# Patient Record
Sex: MALE | Race: WHITE | Employment: FULL TIME | URBAN - NONMETROPOLITAN AREA
[De-identification: names, ages, dates, MRNs, and addresses within clinical notes are randomized per-mention and may not be internally consistent; named-entity substitution may affect disease eponyms.]

---

## 2017-04-11 ENCOUNTER — OFFICE VISIT (OUTPATIENT)
Dept: FAMILY MEDICINE CLINIC | Age: 52
End: 2017-04-11

## 2017-04-11 VITALS
DIASTOLIC BLOOD PRESSURE: 78 MMHG | OXYGEN SATURATION: 98 % | WEIGHT: 214.4 LBS | HEART RATE: 70 BPM | HEIGHT: 71 IN | SYSTOLIC BLOOD PRESSURE: 136 MMHG | BODY MASS INDEX: 30.02 KG/M2 | RESPIRATION RATE: 16 BRPM

## 2017-04-11 DIAGNOSIS — Z12.11 SPECIAL SCREENING FOR MALIGNANT NEOPLASMS, COLON: ICD-10-CM

## 2017-04-11 DIAGNOSIS — E11.9 TYPE 2 DIABETES MELLITUS WITHOUT COMPLICATION, WITHOUT LONG-TERM CURRENT USE OF INSULIN (HCC): Primary | ICD-10-CM

## 2017-04-11 DIAGNOSIS — I10 ESSENTIAL HYPERTENSION: ICD-10-CM

## 2017-04-11 PROCEDURE — 99204 OFFICE O/P NEW MOD 45 MIN: CPT | Performed by: FAMILY MEDICINE

## 2017-04-11 RX ORDER — LISINOPRIL 5 MG/1
5 TABLET ORAL DAILY
Qty: 90 TABLET | Refills: 2 | Status: SHIPPED | OUTPATIENT
Start: 2017-04-11 | End: 2019-09-24 | Stop reason: SDUPTHER

## 2017-04-11 RX ORDER — ASPIRIN 81 MG/1
81 TABLET ORAL DAILY
Qty: 30 TABLET | Refills: 11
Start: 2017-04-11

## 2017-04-11 ASSESSMENT — ENCOUNTER SYMPTOMS
EYE DISCHARGE: 0
BACK PAIN: 0
SHORTNESS OF BREATH: 0
BLOOD IN STOOL: 0
WHEEZING: 0
SINUS PRESSURE: 0
EYE REDNESS: 0
NAUSEA: 0
RHINORRHEA: 0
SORE THROAT: 0
APNEA: 0
VOMITING: 0
ABDOMINAL PAIN: 0
COUGH: 0
COLOR CHANGE: 0
DIARRHEA: 0
CONSTIPATION: 0

## 2019-09-24 ENCOUNTER — OFFICE VISIT (OUTPATIENT)
Dept: FAMILY MEDICINE CLINIC | Age: 54
End: 2019-09-24
Payer: COMMERCIAL

## 2019-09-24 VITALS
WEIGHT: 200.2 LBS | OXYGEN SATURATION: 98 % | HEART RATE: 78 BPM | SYSTOLIC BLOOD PRESSURE: 144 MMHG | RESPIRATION RATE: 12 BRPM | HEIGHT: 70 IN | TEMPERATURE: 98 F | BODY MASS INDEX: 28.66 KG/M2 | DIASTOLIC BLOOD PRESSURE: 82 MMHG

## 2019-09-24 DIAGNOSIS — Z11.59 NEED FOR HEPATITIS B SCREENING TEST: ICD-10-CM

## 2019-09-24 DIAGNOSIS — E11.65 TYPE 2 DIABETES MELLITUS WITH HYPERGLYCEMIA, WITHOUT LONG-TERM CURRENT USE OF INSULIN (HCC): Primary | ICD-10-CM

## 2019-09-24 DIAGNOSIS — I10 ESSENTIAL HYPERTENSION: ICD-10-CM

## 2019-09-24 DIAGNOSIS — E55.9 VITAMIN D DEFICIENCY: ICD-10-CM

## 2019-09-24 DIAGNOSIS — Z23 NEED FOR INFLUENZA VACCINATION: ICD-10-CM

## 2019-09-24 DIAGNOSIS — Z23 NEED FOR SHINGLES VACCINE: ICD-10-CM

## 2019-09-24 DIAGNOSIS — Z11.59 ENCOUNTER FOR HEPATITIS C SCREENING TEST FOR LOW RISK PATIENT: ICD-10-CM

## 2019-09-24 DIAGNOSIS — Z23 NEED FOR TETANUS BOOSTER: ICD-10-CM

## 2019-09-24 DIAGNOSIS — Z12.11 COLON CANCER SCREENING: ICD-10-CM

## 2019-09-24 DIAGNOSIS — Z11.4 SCREENING FOR HIV (HUMAN IMMUNODEFICIENCY VIRUS): ICD-10-CM

## 2019-09-24 DIAGNOSIS — Z13.29 SCREENING FOR THYROID DISORDER: ICD-10-CM

## 2019-09-24 PROBLEM — E11.9 TYPE 2 DIABETES MELLITUS WITHOUT COMPLICATION (HCC): Status: RESOLVED | Noted: 2017-04-11 | Resolved: 2019-09-24

## 2019-09-24 LAB — HBA1C MFR BLD: 13.2 % (ref 4.3–5.7)

## 2019-09-24 PROCEDURE — 1036F TOBACCO NON-USER: CPT | Performed by: NURSE PRACTITIONER

## 2019-09-24 PROCEDURE — 2022F DILAT RTA XM EVC RTNOPTHY: CPT | Performed by: NURSE PRACTITIONER

## 2019-09-24 PROCEDURE — 3017F COLORECTAL CA SCREEN DOC REV: CPT | Performed by: NURSE PRACTITIONER

## 2019-09-24 PROCEDURE — G8427 DOCREV CUR MEDS BY ELIG CLIN: HCPCS | Performed by: NURSE PRACTITIONER

## 2019-09-24 PROCEDURE — 3046F HEMOGLOBIN A1C LEVEL >9.0%: CPT | Performed by: NURSE PRACTITIONER

## 2019-09-24 PROCEDURE — G8419 CALC BMI OUT NRM PARAM NOF/U: HCPCS | Performed by: NURSE PRACTITIONER

## 2019-09-24 PROCEDURE — 99204 OFFICE O/P NEW MOD 45 MIN: CPT | Performed by: NURSE PRACTITIONER

## 2019-09-24 RX ORDER — LISINOPRIL 5 MG/1
5 TABLET ORAL DAILY
Qty: 90 TABLET | Refills: 2 | Status: SHIPPED | OUTPATIENT
Start: 2019-09-24 | End: 2021-09-21 | Stop reason: SDUPTHER

## 2019-09-24 ASSESSMENT — ENCOUNTER SYMPTOMS
COLOR CHANGE: 0
SHORTNESS OF BREATH: 0
EYE DISCHARGE: 0
RHINORRHEA: 0
COUGH: 0
EYE REDNESS: 0
ABDOMINAL PAIN: 0
ANAL BLEEDING: 0
DIARRHEA: 0
BLOOD IN STOOL: 0
ABDOMINAL DISTENTION: 0
NAUSEA: 0
CONSTIPATION: 0
SORE THROAT: 0

## 2019-09-24 ASSESSMENT — PATIENT HEALTH QUESTIONNAIRE - PHQ9
SUM OF ALL RESPONSES TO PHQ QUESTIONS 1-9: 0
1. LITTLE INTEREST OR PLEASURE IN DOING THINGS: 0
SUM OF ALL RESPONSES TO PHQ9 QUESTIONS 1 & 2: 0
SUM OF ALL RESPONSES TO PHQ QUESTIONS 1-9: 0
2. FEELING DOWN, DEPRESSED OR HOPELESS: 0

## 2019-09-24 NOTE — PATIENT INSTRUCTIONS
a heart attack or stroke. But taking aspirin isn't right for everyone, because it can cause serious bleeding. · See your doctor regularly. You may need to see the doctor more often at first or until your blood pressure comes down. · If you are taking blood pressure medicine, talk to your doctor before you take decongestants or anti-inflammatory medicine, such as ibuprofen. Some of these medicines can raise blood pressure. · Learn how to check your blood pressure at home. Lifestyle changes  · Stay at a healthy weight. This is especially important if you put on weight around the waist. Losing even 10 pounds can help you lower your blood pressure. · If your doctor recommends it, get more exercise. Walking is a good choice. Bit by bit, increase the amount you walk every day. Try for at least 30 minutes on most days of the week. You also may want to swim, bike, or do other activities. · Avoid or limit alcohol. Talk to your doctor about whether you can drink any alcohol. · Try to limit how much sodium you eat to less than 2,300 milligrams (mg) a day. Your doctor may ask you to try to eat less than 1,500 mg a day. · Eat plenty of fruits (such as bananas and oranges), vegetables, legumes, whole grains, and low-fat dairy products. · Lower the amount of saturated fat in your diet. Saturated fat is found in animal products such as milk, cheese, and meat. Limiting these foods may help you lose weight and also lower your risk for heart disease. · Do not smoke. Smoking increases your risk for heart attack and stroke. If you need help quitting, talk to your doctor about stop-smoking programs and medicines. These can increase your chances of quitting for good. When should you call for help? Call 911 anytime you think you may need emergency care. This may mean having symptoms that suggest that your blood pressure is causing a serious heart or blood vessel problem.  Your blood pressure may be over 180/120.   For example, by [facility] [place] [facility] [facility] If you have questions about a medical condition or this instruction, always ask your healthcare professional. [address] any warranty or liability for your use of this information. Patient Education        Acute High Blood Pressure: Care Instructions  Your Care Instructions    Acute high blood pressure is very high blood pressure. It's a serious problem. Very high blood pressure can damage your brain, heart, eyes, and kidneys. You may have been given medicines to lower your blood pressure. You may have gotten them as pills or through a needle in one of your veins. This is called an IV. And maybe you were given other medicines too. These can be needed when high blood pressure causes other problems. To keep your blood pressure at a lower level, you may need to make healthy lifestyle changes. And you will probably need to take medicines. Be sure to follow up with your doctor about your blood pressure and what you can do about it. Follow-up care is a key part of your treatment and safety. Be sure to make and go to all appointments, and call your doctor if you are having problems. It's also a good idea to know your test results and keep a list of the medicines you take. How can you care for yourself at home? · See your doctor as often as he or she recommends. This is to make sure your blood pressure is under control. You will probably go at least 2 times a year. But it may be more often at first.  · Take your blood pressure medicine exactly as prescribed. You may take one or more types. They include diuretics, beta-blockers, ACE inhibitors, calcium channel blockers, and angiotensin II receptor blockers. Call your doctor if you think you are having a problem with your medicine. · If you take blood pressure medicine, talk to your doctor before you take decongestants or anti-inflammatory medicine, such as ibuprofen. These can raise blood pressure.   · Learn how to check your blood pressure at home. Check it often. · Ask your doctor if it's okay to drink alcohol. · Talk to your doctor about lifestyle changes that can help blood pressure. These include being active and managing your weight. · Don't smoke. Smoking increases your risk for heart attack and stroke. When should you call for help? Call 911 anytime you think you may need emergency care. This may mean having symptoms that suggest that your blood pressure is causing a serious heart or blood vessel problem. Your blood pressure may be over 180/120.   For example, call 911 if:    · You have symptoms of a heart attack. These may include:  ? Chest pain or pressure, or a strange feeling in the chest.  ? Sweating. ? Shortness of breath. ? Nausea or vomiting. ? Pain, pressure, or a strange feeling in the back, neck, jaw, or upper belly or in one or both shoulders or arms. ? Lightheadedness or sudden weakness. ? A fast or irregular heartbeat.     · You have symptoms of a stroke. These may include:  ? Sudden numbness, tingling, weakness, or loss of movement in your face, arm, or leg, especially on only one side of your body. ? Sudden vision changes. ? Sudden trouble speaking. ? Sudden confusion or trouble understanding simple statements. ? Sudden problems with walking or balance. ? A sudden, severe headache that is different from past headaches.     · You have severe back or belly pain.    Do not wait until your blood pressure comes down on its own. Get help right away.   Call your doctor now or seek immediate care if:    · Your blood pressure is much higher than normal (such as 180/120 or higher), but you don't have symptoms.     · You think high blood pressure is causing symptoms, such as:  ? Severe headache.  ? Blurry vision.    Watch closely for changes in your health, and be sure to contact your doctor if:    · Your blood pressure measures higher than your doctor recommends at least 2 times.  That means the top changes to your diet all at once. You might feel that you are missing out on your favorite foods and then be more likely to not follow the plan. Make small changes, and stick with them. Once those changes become habit, add a few more changes. · Try some of the following:  ? Make it a goal to eat a fruit or vegetable at every meal and at snacks. This will make it easy to get the recommended amount of fruits and vegetables each day. ? Try yogurt topped with fruit and nuts for a snack or healthy dessert. ? Add lettuce, tomato, cucumber, and onion to sandwiches. ? Combine a ready-made pizza crust with low-fat mozzarella cheese and lots of vegetable toppings. Try using tomatoes, squash, spinach, broccoli, carrots, cauliflower, and onions. ? Have a variety of cut-up vegetables with a low-fat dip as an appetizer instead of chips and dip. ? Sprinkle sunflower seeds or chopped almonds over salads. Or try adding chopped walnuts or almonds to cooked vegetables. ? Try some vegetarian meals using beans and peas. Add garbanzo or kidney beans to salads. Make burritos and tacos with mashed burgess beans or black beans. Where can you learn more? Go to [email or web address] and sign in to your [employer] account. Enter B646 in the [facility] box to learn more about \"DASH Diet: Care Instructions. \"     If you do not have an account, please click on the \"Sign Up Now\" link. Current as of: July 22, 2018  Content Version: 12.1  © 3320-9255 [employer]. Care instructions adapted under license by [facility] ([facility]). If you have questions about a medical condition or this instruction, always ask your healthcare professional. [address]  any warranty or liability for your use of this information. Patient Education        Low Sodium Diet (2,000 Milligram): Care Instructions  Your Care Instructions    Too much sodium causes your body to hold on to extra water.  This can

## 2019-09-24 NOTE — PROGRESS NOTES
Health Maintenance Due   Topic Date Due    Potassium monitoring  pended    Creatinine monitoring  pended    Hepatitis C screen  pended    Pneumococcal 0-64 years Vaccine (1 of 1 - PPSV23) refused    Diabetic retinal exam  Needs referral    Lipid screen  pended    HIV screen  pended    Diabetic microalbuminuria test  pended    Hepatitis B Vaccine (1 of 3 - Risk 3-dose series) refused    DTaP/Tdap/Td vaccine (1 - Tdap) refused    A1C test (Diabetic or Prediabetic)  today    Shingles Vaccine (1 of 2) refused    Colon cancer screen colonoscopy  refused    Diabetic foot exam  pended    Flu vaccine (1) refused
reviewed. No pertinent surgical history. Family History   Problem Relation Age of Onset    No Known Problems Mother     Cancer Father         unknown    No Known Problems Sister      Social History     Tobacco Use    Smoking status: Never Smoker    Smokeless tobacco: Never Used   Substance Use Topics    Alcohol use: No      Current Outpatient Medications   Medication Sig Dispense Refill    lisinopril (PRINIVIL;ZESTRIL) 5 MG tablet Take 1 tablet by mouth daily 90 tablet 2    metFORMIN (GLUCOPHAGE) 1000 MG tablet Take 1 tablet by mouth 2 times daily (with meals) 180 tablet 2    zoster recombinant adjuvanted vaccine (SHINGRIX) 50 MCG/0.5ML SUSR injection Inject 0.5 mLs into the muscle See Admin Instructions 1 dose now and repeat in 2-6 months 0.5 mL 0    aspirin EC 81 MG EC tablet Take 1 tablet by mouth daily 30 tablet 11     No current facility-administered medications for this visit. No Known Allergies    Subjective:    Review of Systems   Constitutional: Negative for chills, fatigue and fever. HENT: Negative for congestion, ear pain, postnasal drip, rhinorrhea and sore throat. Eyes: Negative for discharge and redness. Respiratory: Negative for cough and shortness of breath. Cardiovascular: Negative for chest pain and leg swelling. Gastrointestinal: Negative for abdominal distention, abdominal pain, anal bleeding, blood in stool, constipation, diarrhea and nausea. Skin: Negative for color change and rash. Neurological: Negative for facial asymmetry, speech difficulty and weakness. Hematological: Does not bruise/bleed easily. Psychiatric/Behavioral: Negative for agitation and confusion. Objective:     Vitals:    09/24/19 1011 09/24/19 1017   BP: (!) 170/74 (!) 144/82   Pulse: 78    Resp: 12    Temp: 98 °F (36.7 °C)    TempSrc: Oral    SpO2: 98%    Weight: 200 lb 3.2 oz (90.8 kg)    Height: 5' 10.47\" (1.79 m)        Body mass index is 28.34 kg/m².     Wt Readings from Last 3

## 2020-09-09 ENCOUNTER — TELEPHONE (OUTPATIENT)
Dept: FAMILY MEDICINE CLINIC | Age: 55
End: 2020-09-09

## 2021-07-29 ENCOUNTER — TELEPHONE (OUTPATIENT)
Dept: FAMILY MEDICINE CLINIC | Age: 56
End: 2021-07-29

## 2021-09-13 ENCOUNTER — HOSPITAL ENCOUNTER (INPATIENT)
Age: 56
LOS: 5 days | Discharge: HOME | DRG: 177 | End: 2021-09-18
Attending: HOSPITALIST | Admitting: HOSPITALIST
Payer: COMMERCIAL

## 2021-09-13 ENCOUNTER — APPOINTMENT (OUTPATIENT)
Dept: CT IMAGING | Age: 56
DRG: 177 | End: 2021-09-13
Payer: COMMERCIAL

## 2021-09-13 ENCOUNTER — APPOINTMENT (OUTPATIENT)
Dept: GENERAL RADIOLOGY | Age: 56
DRG: 177 | End: 2021-09-13
Payer: COMMERCIAL

## 2021-09-13 DIAGNOSIS — U07.1 ACUTE HYPOXEMIC RESPIRATORY FAILURE DUE TO COVID-19 (HCC): Primary | ICD-10-CM

## 2021-09-13 DIAGNOSIS — J96.01 ACUTE HYPOXEMIC RESPIRATORY FAILURE DUE TO COVID-19 (HCC): Primary | ICD-10-CM

## 2021-09-13 PROBLEM — E11.9 TYPE 2 DIABETES MELLITUS WITHOUT COMPLICATION, WITHOUT LONG-TERM CURRENT USE OF INSULIN (HCC): Status: ACTIVE | Noted: 2021-09-13

## 2021-09-13 LAB
ALBUMIN SERPL-MCNC: 3.1 G/DL (ref 3.5–5.1)
ALP BLD-CCNC: 77 U/L (ref 38–126)
ALT SERPL-CCNC: 24 U/L (ref 11–66)
ANION GAP SERPL CALCULATED.3IONS-SCNC: 19 MEQ/L (ref 8–16)
APTT: 29 SECONDS (ref 22–38)
AST SERPL-CCNC: 26 U/L (ref 5–40)
BASOPHILS # BLD: 0.3 %
BASOPHILS ABSOLUTE: 0 THOU/MM3 (ref 0–0.1)
BILIRUB SERPL-MCNC: 0.6 MG/DL (ref 0.3–1.2)
BUN BLDV-MCNC: 14 MG/DL (ref 7–22)
C-REACTIVE PROTEIN: 29.22 MG/DL (ref 0–1)
CALCIUM SERPL-MCNC: 9.2 MG/DL (ref 8.5–10.5)
CHLORIDE BLD-SCNC: 95 MEQ/L (ref 98–111)
CO2: 20 MEQ/L (ref 23–33)
CREAT SERPL-MCNC: 0.8 MG/DL (ref 0.4–1.2)
D-DIMER QUANTITATIVE: 1353 NG/ML FEU (ref 0–500)
EOSINOPHIL # BLD: 0.1 %
EOSINOPHILS ABSOLUTE: 0 THOU/MM3 (ref 0–0.4)
ERYTHROCYTE [DISTWIDTH] IN BLOOD BY AUTOMATED COUNT: 12 % (ref 11.5–14.5)
ERYTHROCYTE [DISTWIDTH] IN BLOOD BY AUTOMATED COUNT: 38 FL (ref 35–45)
FERRITIN: 2443 NG/ML (ref 22–322)
FIBRINOGEN: 970 MG/100ML (ref 155–475)
GFR SERPL CREATININE-BSD FRML MDRD: > 90 ML/MIN/1.73M2
GLUCOSE BLD-MCNC: 260 MG/DL (ref 70–108)
HCT VFR BLD CALC: 43.1 % (ref 42–52)
HEMOGLOBIN: 15 GM/DL (ref 14–18)
IMMATURE GRANS (ABS): 0.07 THOU/MM3 (ref 0–0.07)
IMMATURE GRANULOCYTES: 1 %
INR BLD: 1.29 (ref 0.85–1.13)
LACTIC ACID: 2.7 MMOL/L (ref 0.5–2)
LD: 383 U/L (ref 100–190)
LYMPHOCYTES # BLD: 7.7 %
LYMPHOCYTES ABSOLUTE: 0.5 THOU/MM3 (ref 1–4.8)
MAGNESIUM: 2 MG/DL (ref 1.6–2.4)
MCH RBC QN AUTO: 30.1 PG (ref 26–33)
MCHC RBC AUTO-ENTMCNC: 34.8 GM/DL (ref 32.2–35.5)
MCV RBC AUTO: 86.4 FL (ref 80–94)
MONOCYTES # BLD: 5.3 %
MONOCYTES ABSOLUTE: 0.4 THOU/MM3 (ref 0.4–1.3)
NUCLEATED RED BLOOD CELLS: 0 /100 WBC
OSMOLALITY CALCULATION: 277.7 MOSMOL/KG (ref 275–300)
PLATELET # BLD: 251 THOU/MM3 (ref 130–400)
PMV BLD AUTO: 11.1 FL (ref 9.4–12.4)
POTASSIUM REFLEX MAGNESIUM: 3.5 MEQ/L (ref 3.5–5.2)
PROCALCITONIN: 0.37 NG/ML (ref 0.01–0.09)
PROCALCITONIN: 0.57 NG/ML (ref 0.01–0.09)
RBC # BLD: 4.99 MILL/MM3 (ref 4.7–6.1)
SARS-COV-2, NAAT: DETECTED
SEG NEUTROPHILS: 85.6 %
SEGMENTED NEUTROPHILS ABSOLUTE COUNT: 5.9 THOU/MM3 (ref 1.8–7.7)
SODIUM BLD-SCNC: 134 MEQ/L (ref 135–145)
TOTAL PROTEIN: 6.7 G/DL (ref 6.1–8)
TROPONIN T: < 0.01 NG/ML
VITAMIN D 25-HYDROXY: 22 NG/ML (ref 30–100)
WBC # BLD: 6.9 THOU/MM3 (ref 4.8–10.8)

## 2021-09-13 PROCEDURE — 86140 C-REACTIVE PROTEIN: CPT

## 2021-09-13 PROCEDURE — 82306 VITAMIN D 25 HYDROXY: CPT

## 2021-09-13 PROCEDURE — 85610 PROTHROMBIN TIME: CPT

## 2021-09-13 PROCEDURE — 84145 PROCALCITONIN (PCT): CPT

## 2021-09-13 PROCEDURE — 71275 CT ANGIOGRAPHY CHEST: CPT

## 2021-09-13 PROCEDURE — 99223 1ST HOSP IP/OBS HIGH 75: CPT | Performed by: HOSPITALIST

## 2021-09-13 PROCEDURE — 83615 LACTATE (LD) (LDH) ENZYME: CPT

## 2021-09-13 PROCEDURE — 36415 COLL VENOUS BLD VENIPUNCTURE: CPT

## 2021-09-13 PROCEDURE — 80053 COMPREHEN METABOLIC PANEL: CPT

## 2021-09-13 PROCEDURE — 85385 FIBRINOGEN ANTIGEN: CPT

## 2021-09-13 PROCEDURE — 85025 COMPLETE CBC W/AUTO DIFF WBC: CPT

## 2021-09-13 PROCEDURE — 1200000003 HC TELEMETRY R&B

## 2021-09-13 PROCEDURE — 83036 HEMOGLOBIN GLYCOSYLATED A1C: CPT

## 2021-09-13 PROCEDURE — 85379 FIBRIN DEGRADATION QUANT: CPT

## 2021-09-13 PROCEDURE — 96374 THER/PROPH/DIAG INJ IV PUSH: CPT

## 2021-09-13 PROCEDURE — 85730 THROMBOPLASTIN TIME PARTIAL: CPT

## 2021-09-13 PROCEDURE — 87205 SMEAR GRAM STAIN: CPT

## 2021-09-13 PROCEDURE — 83605 ASSAY OF LACTIC ACID: CPT

## 2021-09-13 PROCEDURE — 1200000000 HC SEMI PRIVATE

## 2021-09-13 PROCEDURE — 6360000004 HC RX CONTRAST MEDICATION: Performed by: NURSE PRACTITIONER

## 2021-09-13 PROCEDURE — 2580000003 HC RX 258: Performed by: NURSE PRACTITIONER

## 2021-09-13 PROCEDURE — 6360000002 HC RX W HCPCS: Performed by: HOSPITALIST

## 2021-09-13 PROCEDURE — 6360000002 HC RX W HCPCS: Performed by: NURSE PRACTITIONER

## 2021-09-13 PROCEDURE — 87635 SARS-COV-2 COVID-19 AMP PRB: CPT

## 2021-09-13 PROCEDURE — 87899 AGENT NOS ASSAY W/OPTIC: CPT

## 2021-09-13 PROCEDURE — 99285 EMERGENCY DEPT VISIT HI MDM: CPT

## 2021-09-13 PROCEDURE — 71045 X-RAY EXAM CHEST 1 VIEW: CPT

## 2021-09-13 PROCEDURE — 87070 CULTURE OTHR SPECIMN AEROBIC: CPT

## 2021-09-13 PROCEDURE — 96361 HYDRATE IV INFUSION ADD-ON: CPT

## 2021-09-13 PROCEDURE — 2580000003 HC RX 258: Performed by: HOSPITALIST

## 2021-09-13 PROCEDURE — 84484 ASSAY OF TROPONIN QUANT: CPT

## 2021-09-13 PROCEDURE — 82728 ASSAY OF FERRITIN: CPT

## 2021-09-13 PROCEDURE — 87449 NOS EACH ORGANISM AG IA: CPT

## 2021-09-13 PROCEDURE — 83735 ASSAY OF MAGNESIUM: CPT

## 2021-09-13 RX ORDER — ACETAMINOPHEN 650 MG/1
650 SUPPOSITORY RECTAL EVERY 6 HOURS PRN
Status: DISCONTINUED | OUTPATIENT
Start: 2021-09-13 | End: 2021-09-13

## 2021-09-13 RX ORDER — GUAIFENESIN/DEXTROMETHORPHAN 100-10MG/5
5 SYRUP ORAL EVERY 4 HOURS PRN
Status: DISCONTINUED | OUTPATIENT
Start: 2021-09-13 | End: 2021-09-18 | Stop reason: HOSPADM

## 2021-09-13 RX ORDER — SODIUM CHLORIDE 0.9 % (FLUSH) 0.9 %
5-40 SYRINGE (ML) INJECTION EVERY 12 HOURS SCHEDULED
Status: DISCONTINUED | OUTPATIENT
Start: 2021-09-13 | End: 2021-09-18 | Stop reason: HOSPADM

## 2021-09-13 RX ORDER — SODIUM CHLORIDE 9 MG/ML
25 INJECTION, SOLUTION INTRAVENOUS PRN
Status: DISCONTINUED | OUTPATIENT
Start: 2021-09-13 | End: 2021-09-18 | Stop reason: HOSPADM

## 2021-09-13 RX ORDER — NICOTINE POLACRILEX 4 MG
15 LOZENGE BUCCAL PRN
Status: DISCONTINUED | OUTPATIENT
Start: 2021-09-13 | End: 2021-09-18 | Stop reason: HOSPADM

## 2021-09-13 RX ORDER — DEXTROSE MONOHYDRATE 25 G/50ML
12.5 INJECTION, SOLUTION INTRAVENOUS PRN
Status: DISCONTINUED | OUTPATIENT
Start: 2021-09-13 | End: 2021-09-18 | Stop reason: HOSPADM

## 2021-09-13 RX ORDER — ACETAMINOPHEN 650 MG/1
650 SUPPOSITORY RECTAL EVERY 6 HOURS PRN
Status: DISCONTINUED | OUTPATIENT
Start: 2021-09-13 | End: 2021-09-18 | Stop reason: HOSPADM

## 2021-09-13 RX ORDER — ONDANSETRON 2 MG/ML
4 INJECTION INTRAMUSCULAR; INTRAVENOUS EVERY 6 HOURS PRN
Status: DISCONTINUED | OUTPATIENT
Start: 2021-09-13 | End: 2021-09-18 | Stop reason: HOSPADM

## 2021-09-13 RX ORDER — DEXAMETHASONE SODIUM PHOSPHATE 4 MG/ML
6 INJECTION, SOLUTION INTRA-ARTICULAR; INTRALESIONAL; INTRAMUSCULAR; INTRAVENOUS; SOFT TISSUE EVERY 24 HOURS
Status: DISCONTINUED | OUTPATIENT
Start: 2021-09-13 | End: 2021-09-13 | Stop reason: SDUPTHER

## 2021-09-13 RX ORDER — ACETAMINOPHEN 325 MG/1
650 TABLET ORAL EVERY 6 HOURS PRN
Status: DISCONTINUED | OUTPATIENT
Start: 2021-09-13 | End: 2021-09-13

## 2021-09-13 RX ORDER — DEXAMETHASONE SODIUM PHOSPHATE 4 MG/ML
6 INJECTION, SOLUTION INTRA-ARTICULAR; INTRALESIONAL; INTRAMUSCULAR; INTRAVENOUS; SOFT TISSUE EVERY 24 HOURS
Status: DISCONTINUED | OUTPATIENT
Start: 2021-09-14 | End: 2021-09-18 | Stop reason: HOSPADM

## 2021-09-13 RX ORDER — DEXAMETHASONE 4 MG/1
6 TABLET ORAL ONCE
Status: COMPLETED | OUTPATIENT
Start: 2021-09-13 | End: 2021-09-13

## 2021-09-13 RX ORDER — SODIUM CHLORIDE 0.9 % (FLUSH) 0.9 %
5-40 SYRINGE (ML) INJECTION PRN
Status: DISCONTINUED | OUTPATIENT
Start: 2021-09-13 | End: 2021-09-18 | Stop reason: HOSPADM

## 2021-09-13 RX ORDER — DEXTROSE MONOHYDRATE 50 MG/ML
100 INJECTION, SOLUTION INTRAVENOUS PRN
Status: DISCONTINUED | OUTPATIENT
Start: 2021-09-13 | End: 2021-09-18 | Stop reason: HOSPADM

## 2021-09-13 RX ORDER — INSULIN GLARGINE 100 [IU]/ML
0.25 INJECTION, SOLUTION SUBCUTANEOUS NIGHTLY
Status: DISCONTINUED | OUTPATIENT
Start: 2021-09-13 | End: 2021-09-14

## 2021-09-13 RX ORDER — POLYETHYLENE GLYCOL 3350 17 G/17G
17 POWDER, FOR SOLUTION ORAL DAILY PRN
Status: DISCONTINUED | OUTPATIENT
Start: 2021-09-13 | End: 2021-09-18 | Stop reason: HOSPADM

## 2021-09-13 RX ORDER — ASPIRIN 81 MG/1
81 TABLET ORAL DAILY
Status: DISCONTINUED | OUTPATIENT
Start: 2021-09-14 | End: 2021-09-18 | Stop reason: HOSPADM

## 2021-09-13 RX ORDER — 0.9 % SODIUM CHLORIDE 0.9 %
1000 INTRAVENOUS SOLUTION INTRAVENOUS ONCE
Status: COMPLETED | OUTPATIENT
Start: 2021-09-13 | End: 2021-09-13

## 2021-09-13 RX ORDER — ONDANSETRON 4 MG/1
4 TABLET, ORALLY DISINTEGRATING ORAL EVERY 8 HOURS PRN
Status: DISCONTINUED | OUTPATIENT
Start: 2021-09-13 | End: 2021-09-18 | Stop reason: HOSPADM

## 2021-09-13 RX ORDER — ACETAMINOPHEN 325 MG/1
650 TABLET ORAL EVERY 6 HOURS PRN
Status: DISCONTINUED | OUTPATIENT
Start: 2021-09-13 | End: 2021-09-18 | Stop reason: HOSPADM

## 2021-09-13 RX ORDER — LISINOPRIL 5 MG/1
5 TABLET ORAL DAILY
Status: DISCONTINUED | OUTPATIENT
Start: 2021-09-14 | End: 2021-09-18 | Stop reason: HOSPADM

## 2021-09-13 RX ADMIN — AZITHROMYCIN DIHYDRATE 500 MG: 500 INJECTION, POWDER, LYOPHILIZED, FOR SOLUTION INTRAVENOUS at 17:35

## 2021-09-13 RX ADMIN — DEXAMETHASONE 6 MG: 4 TABLET ORAL at 15:24

## 2021-09-13 RX ADMIN — SODIUM CHLORIDE, PRESERVATIVE FREE 10 ML: 5 INJECTION INTRAVENOUS at 23:01

## 2021-09-13 RX ADMIN — IOPAMIDOL 80 ML: 755 INJECTION, SOLUTION INTRAVENOUS at 17:47

## 2021-09-13 RX ADMIN — ENOXAPARIN SODIUM 30 MG: 30 INJECTION SUBCUTANEOUS at 23:00

## 2021-09-13 RX ADMIN — SODIUM CHLORIDE 1000 ML: 9 INJECTION, SOLUTION INTRAVENOUS at 16:16

## 2021-09-13 ASSESSMENT — ENCOUNTER SYMPTOMS
RHINORRHEA: 1
CONSTIPATION: 0
SINUS PRESSURE: 0
SORE THROAT: 0
NAUSEA: 1
SINUS PAIN: 0
DIARRHEA: 1
SHORTNESS OF BREATH: 1
COUGH: 1
BACK PAIN: 0
WHEEZING: 0
EYE ITCHING: 0
SINUS PAIN: 1
ABDOMINAL PAIN: 0
VOMITING: 0
RHINORRHEA: 0

## 2021-09-13 ASSESSMENT — PAIN DESCRIPTION - DESCRIPTORS: DESCRIPTORS: HEADACHE

## 2021-09-13 ASSESSMENT — PAIN DESCRIPTION - PAIN TYPE: TYPE: ACUTE PAIN

## 2021-09-13 ASSESSMENT — PAIN DESCRIPTION - LOCATION: LOCATION: HEAD

## 2021-09-13 ASSESSMENT — PAIN SCALES - GENERAL: PAINLEVEL_OUTOF10: 5

## 2021-09-13 NOTE — ED NOTES
Pt reassessed at this time. Resting on cot in stable condition. Vitals stable. Family at bedside. Pt denying pain. Call light in reach. Droplet precautions in place.       Karsten Salmeron RN  09/13/21 7118

## 2021-09-13 NOTE — H&P
History & Physical        Patient:  Luis Felipe Little  YOB: 1965    MRN: [id number]     Acct: [de-identified]    PCP: WOO Paz CNP    Date of Admission: 9/13/2021    Date of Service: Pt seen[facility]examined on 09/13/21  [facility] two [facility] due to medical therapy. Chief Complaint: Generalized weakness and shortness of breath x2 weeks      History Of Present Illness:    64 y.o. male with medical history significant for hypertension, type 2 diabetes; who presented to [address] [place] [address] with generalized weakness and shortness of breath x2 weeks. Patient reports that for the past 2 weeks he has been having decreased appetite and decreased level of activity. Patient has noted some fevers and chills in the earlier part of his illness. Patient also reports having shortness of breath with cough. Patient states that he has had some occasional chest pain. Patient notes having diarrhea for few days that has since resolved. Patient also reports having some dizziness. Patient states that he has had some nausea which he attributes to coughing up a lot of phlegm and swallowing it. Patient states that the shortness of breath has gotten progressive to the point where he has a difficult time being able to ambulate in his home. No URI or UTI type symptoms, no constipation. Past Medical History:          Diagnosis Date    Hypertension     [facility]       Past Surgical History:      History reviewed. No pertinent surgical history. Medications Prior to Admission:      Prior to Admission medications    Medication Sig Start Date End Date Taking?  Authorizing Provider   lisinopril (PRINIVIL;ZESTRIL) 5 MG tablet Take 1 tablet by mouth daily 9/24/19   WOO Paz CNP   metFORMIN (GLUCOPHAGE) 1000 MG tablet Take 1 tablet by mouth 2 times daily (with meals) 9/24/19   WOO Paz CNP   aspirin EC 81 MG EC tablet Take 1 tablet by mouth daily 4/11/17   Jem Wadsworth MD       Allergies:  Patient has no known allergies. Social History:      The patient currently lives home. TOBACCO:  reports that he has never smoked. He has never used smokeless tobacco.  ETOH:   reports no history of alcohol use. Family History:     Reviewed in detail and negativefor DM, CAD, Cancer, CVA. Positive as follows:        Problem Relation Age of Onset    No Known Problems Mother     Cancer Father         unknown    No Known Problems Sister        REVIEW OFSYSTEMS:   Pertinent positives as noted in the HPI. All other systems reviewed and negative. Review of Systems   Constitutional: Positive for activity change, appetite change, chills, fatigue and fever. HENT: Negative for congestion, ear pain, postnasal drip, rhinorrhea, sinus pressure, sinus pain, sneezing and sore throat. Eyes: Negative for itching. Respiratory: Positive for cough and shortness of breath. Negative for wheezing. Cardiovascular: Positive for chest pain. Negative for leg swelling. Gastrointestinal: Positive for diarrhea and nausea. Negative for abdominal pain, constipation and vomiting. Genitourinary: Negative for decreased urine volume, difficulty urinating, dysuria, frequency, hematuria, penile pain and urgency. Musculoskeletal: Positive for myalgias. Negative for arthralgias and back pain. Neurological: Positive for dizziness. Negative for headaches. PHYSICAL EXAM:    /74   Pulse 101   Temp 99.3 °F (37.4 °C)   Resp 24   Ht 5' 11\" (1.803 m)   Wt 225 lb (102.1 kg)   SpO2 95%   BMI 31.38 kg/m²   Physical Exam  Constitutional:       Appearance: Normal appearance. HENT:      Head: Normocephalic and atraumatic. Right Ear: External ear normal.      Left Ear: External ear normal.      Nose: Nose normal.      Mouth/Throat:      Pharynx: Oropharynx is clear.    Eyes:      Extraocular Movements: Extraocular movements intact. Pupils: Pupils are equal, round, and reactive to light. Cardiovascular:      Rate and Rhythm: Normal rate and regular rhythm. Pulses: Normal pulses. Heart sounds: Normal heart sounds. No murmur heard. No friction rub. No gallop. Pulmonary:      Effort: Pulmonary effort is normal. No respiratory distress. Breath sounds: Normal breath sounds. No wheezing, rhonchi or rales. Abdominal:      General: Bowel sounds are normal. There is no distension. Tenderness: There is no abdominal tenderness. Musculoskeletal:         General: No swelling. Normal range of motion. Cervical back: Normal range of motion and neck supple. Skin:     General: Skin is warm. Neurological:      General: No focal deficit present. Mental Status: He is alert. Labs:     Recent Labs     09/13/21  1520   WBC 6.9   HGB 15.0   HCT 43.1        Recent Labs     09/13/21  1520   *   K 3.5   CL 95*   CO2 20*   BUN 14   CREATININE 0.8   CALCIUM 9.2     Recent Labs     09/13/21  1520   AST 26   ALT 24   BILITOT 0.6   ALKPHOS 77     Recent Labs     09/13/21  1520   INR 1.29*     No results for input(s): Rafaela Pace in the last 72 hours. Urinalysis:   No results found for: NITRU, WBCUA, BACTERIA, RBCUA, BLOODU, SPECGRAV, GLUCOSEU    Intake & Output:  No intake/output data recorded. No intake/output data recorded. Radiology:       CTA CHEST W WO CONTRAST   Final Result   Extensive bilateral groundglass infiltrates no PE            **This report has been created using voice recognition software. It may contain minor errors which are inherent in voice recognition technology. **      Final report electronically signed by Dr. Kin Shetty on 9/13/2021 6:05 PM      XR CHEST PORTABLE   Final Result   Bilateral peripheral infiltrates both upper and lower lungs            **This report has been created using voice recognition software.   It may contain minor errors which are inherent in voice recognition technology. **      Final report electronically signed by Dr. Rodrigues Given on 9/13/2021 3:47 PM               DVT prophylaxis: [x] Lovenox                                 [] SCDs                                 [] SQ Heparin                                 [] Encourage ambulation           [] Already on Anticoagulation    Code Status: Full Code      PT/OT Eval Status: None ordered    Disposition:    [x] Home       [] TCU       [] Rehab       [] Psych       [] SNF       [] [place]       [] Other-    ASSESSMENT:    Active Hospital Problems    Diagnosis Date Noted    COVID-19 virus infection [U07.1] 09/13/2021     Priority: High    Acute respiratory failure with hypoxia ([facility] [J96.01] 09/13/2021     Priority: High    Type 2 diabetes mellitus without complication, without long-term current use of insulin ([facility] [E11.9] 09/13/2021     Priority: Low    Essential hypertension [I10] 04/11/2017     Priority: Low       PLAN:    1. Acute respiratory failure with hypoxia-patient presents with shortness of breath x2 weeks. Patient found to be hypoxic in the ED with an O2 saturation of 87% on room air. Patient placed on 2 L of oxygen with improvement of oxygen to 94%. Patient will be weaned off of oxygen as able. 2. COVID-19 infection-patient reports that he has had symptoms for the past 2 weeks. Patient tested positive for COVID-19 on 9/13/2021. As patient has had symptoms for the past 2 weeks, patient will not be started on remdesivir. Patient will be placed on dexamethasone. We will also give patient vitamin supplements with zinc, vitamin C and vitamin D. We will follow patient's inflammatory markers during hospitalization. 3. Essential hypertension-patient with history of hypertension. Patient will be placed on his home blood pressure medications. 4. Type 2 diabetes-patient with history of type 2 diabetes.   Patient's oral medications will be held for now.  Patient will be placed on Lantus weight-based. Dexamethasone will most likely cause patient's blood sugars to go up. Patient will also be placed on insulin sliding scale. Thank you WOO Guerrero CNP for the opportunity to be involved in this patient's care.     Electronically signed by Yoli Anderson MD on 9/13/2021 at 6:38 PM

## 2021-09-13 NOTE — ED NOTES
Pt reassessed. Resting on cot in stable condition. Pt vitals stable. Call light in reach.       William Montes RN  09/13/21 [id number]

## 2021-09-13 NOTE — ED NOTES
ED to inpatient nurses report    Chief Complaint   Patient presents with    Positive For Covid-19    Headache    Shortness of Breath      Present to ED from home  LOC: alert and orientated to name, place, date  Vital signs   Vitals:    09/13/21 1514 09/13/21 1612 09/13/21 1736 09/13/21 1824   BP:  (!) 152/78 (!) 151/83 137/74   Pulse:  104 99 101   Resp:  24 22 24   Temp:       SpO2: (S) 94% 96% 96% 95%   Weight:       Height:          Oxygen Baseline room air    Current needs required 2L NC   LDAs:   Peripheral IV 09/13/21 Right Antecubital (Active)   Site Assessment Clean;Dry; Intact 09/13/21 1825   Line Status Infusing 09/13/21 1825   Dressing Status Clean; Intact;Dry 09/13/21 1825     Mobility: Requires assistance * 1  Pending ED orders: none  Present condition: stable      Electronically signed by Layla Asher RN on 9/13/2021 at 7:02 PM     Layla Asher RN  09/13/21 2106

## 2021-09-13 NOTE — ED NOTES
Pt resting on cot in stable condition. Vitals stable.  Call light in reach     Nicky Shah RN  09/13/21 7896

## 2021-09-13 NOTE — ED PROVIDER NOTES
[place] [id number]       Chief Complaint   Patient presents with    Positive For Covid-19    Headache    Shortness of Breath       Nurses Notes reviewed and I agree except as noted in the HPI. HISTORY OF PRESENT ILLNESS    Kalyn Ramsay is a 64 y.o. male who presents to the ED for evaluation of shortness of breath. Patient notes he has been ill for approximately 1.5 weeks. He notes he has had generalized fatigue, headache, myalgia. He notes he has had nausea vomiting. He notes significant increase in cough and shortness of breath. He notes cough is productive with phlegm. He denies any history of lung disease in the past.  He denies any ill contacts. He is unvaccinated for COVID-19. He has a history of type 2 diabetes, he has not been taking Metformin lately due to illness. He denies being tested for COVID-19 over the last week and a half. He has been taking Tylenol over-the-counter for headache. HPI was provided by the patient. REVIEW OF SYSTEMS     Review of Systems   Constitutional: Positive for activity change, chills, fatigue and fever. HENT: Positive for congestion, rhinorrhea and sinus pain. PAST MEDICAL HISTORY     Past Medical History:   Diagnosis Date    Hypertension     Type 2 diabetes mellitus without complication ([facility] [address] 1159       SURGICALHISTORY      has no past surgical history on file.     CURRENT MEDICATIONS       Current Discharge Medication List      CONTINUE these medications which have NOT CHANGED    Details   lisinopril (PRINIVIL;ZESTRIL) 5 MG tablet Take 1 tablet by mouth daily  Qty: 90 tablet, Refills: 2    Associated Diagnoses: Essential hypertension      metFORMIN (GLUCOPHAGE) 1000 MG tablet Take 1 tablet by mouth 2 times daily (with meals)  Qty: 180 tablet, Refills: 2    Associated Diagnoses: Type 2 diabetes mellitus with hyperglycemia, without long-term current use of insulin ([facility])      aspirin EC 81 MG EC tablet Take 1 tablet by mouth daily  Qty: 30 tablet, Refills: 11    Associated Diagnoses: Type 2 diabetes mellitus without complication, without long-term current use of insulin (HCC)             ALLERGIES     has No Known Allergies. FAMILY HISTORY     He indicated that his mother is alive. He indicated that his father is . He indicated that his sister is alive. family history includes Cancer in his father; No Known Problems in his mother and sister. SOCIAL HISTORY       Social History     Socioeconomic History    Marital status:      Spouse name: Not on file    Number of children: Not on file    Years of education: Not on file    Highest education level: Not on file   Occupational History    Not on file   Tobacco Use    Smoking status: Never Smoker    Smokeless tobacco: Never Used   Vaping Use    Vaping Use: Never used   Substance and Sexual Activity    Alcohol use: No    Drug use: No    Sexual activity: Yes     Partners: Female   Other Topics Concern    Not on file   Social History Narrative    Not on file     Social Determinants of Health     Financial Resource Strain:     Difficulty of Paying Living Expenses:    Food Insecurity:     Worried About Running Out of Food in the Last Year:     [address] in the Last Year:    Transportation Needs:     Lack of Transportation (Medical):      Lack of Transportation (Non-Medical):    Physical Activity:     Days of Exercise per Week:     Minutes of Exercise per Session:    Stress:     Feeling of Stress :    Social Connections:     Frequency of Communication with Friends and Family:     Frequency of Social Gatherings with Friends and Family:     Attends Anabaptism Services:     Active Member of Clubs or Organizations:     Attends Club or Organization Meetings:     Marital Status:    Intimate Partner Violence:     Fear of Current or Ex-Partner:     Emotionally Abused:     Physically Abused:     Sexually Abused:        PHYSICAL EXAM INITIAL VITALS:  height is 5' 11\" (1.803 m) and weight is 225 lb (102.1 kg). His oral temperature is 97.7 °F (36.5 °C). His blood pressure is 136/79 and his pulse is 84. His respiration is 20 and oxygen saturation is 97%. Physical Exam  Vitals and nursing note reviewed. Constitutional:       General: He is not in acute distress. Appearance: He is well-developed and normal weight. He is ill-appearing. He is not toxic-appearing or diaphoretic. HENT:      Head: Normocephalic. Cardiovascular:      Rate and Rhythm: Tachycardia present. Pulses: Normal pulses. Heart sounds: No murmur heard. Pulmonary:      Effort: Pulmonary effort is normal. Tachypnea present. No accessory muscle usage. Breath sounds: Rhonchi present. Chest:      Chest wall: No deformity or tenderness. Abdominal:      General: Bowel sounds are normal.      Palpations: Abdomen is soft. Musculoskeletal:         General: Normal range of motion. Skin:     General: Skin is warm. Capillary Refill: Capillary refill takes less than 2 seconds. Neurological:      General: No focal deficit present. Mental Status: He is alert. Psychiatric:         Mood and Affect: Mood normal.         DIFFERENTIAL DIAGNOSIS:   COVID-19, pneumonia, dehydration, electrolyte abnormality    DIAGNOSTIC RESULTS         RADIOLOGY: non-plainfilm images(s) such as CT, Ultrasound and MRI are read by the radiologist.  Plain radiographic images are visualized and preliminarily interpreted by the emergency physician unless otherwise stated below. CTA CHEST W WO CONTRAST   Final Result   Extensive bilateral groundglass infiltrates no PE            **This report has been created using voice recognition software. It may contain minor errors which are inherent in voice recognition technology. **      Final report electronically signed by Dr. Donald Nguyen on 9/13/2021 6:05 PM      XR CHEST PORTABLE   Final Result   Bilateral peripheral infiltrates both upper and lower lungs            **This report has been created using voice recognition software. It may contain minor errors which are inherent in voice recognition technology. **      Final report electronically signed by Dr. Alisha Gaona on 9/13/2021 3:47 PM            LABS:   Labs Reviewed   COVID-19, RAPID - Abnormal; Notable for the following components:       Result Value    SARS-CoV-2, NAAT DETECTED (*)     All other components within normal limits   C-REACTIVE PROTEIN - Abnormal; Notable for the following components:    CRP 29.22 (*)     All other components within normal limits   LACTATE DEHYDROGENASE - Abnormal; Notable for the following components:     (*)     All other components within normal limits   FERRITIN - Abnormal; Notable for the following components:    Ferritin 2,443 (*)     All other components within normal limits   D-DIMER, QUANTITATIVE - Abnormal; Notable for the following components:    D-Dimer, Quant 1353.00 (*)     All other components within normal limits   LACTIC ACID, PLASMA - Abnormal; Notable for the following components:    Lactic Acid 2.7 (*)     All other components within normal limits   VITAMIN D 25 HYDROXY - Abnormal; Notable for the following components:    Vit D, 25-Hydroxy 22 (*)     All other components within normal limits   PROCALCITONIN - Abnormal; Notable for the following components:    Procalcitonin 0.37 (*)     All other components within normal limits   COMPREHENSIVE METABOLIC PANEL W/ REFLEX TO MG FOR LOW K - Abnormal; Notable for the following components:    Glucose 260 (*)     Sodium 134 (*)     Chloride 95 (*)     CO2 20 (*)     Albumin 3.1 (*)     All other components within normal limits   CBC WITH AUTO DIFFERENTIAL - Abnormal; Notable for the following components:    Lymphocytes Absolute 0.5 (*)     All other components within normal limits   PROTIME-INR - Abnormal; Notable for the following components:    INR 1.29 (*)     All other components within normal limits   FIBRINOGEN - Abnormal; Notable for the following components:    Fibrinogen 970 (*)     All other components within normal limits   ANION GAP - Abnormal; Notable for the following components:    Anion Gap 19.0 (*)     All other components within normal limits   PROCALCITONIN - Abnormal; Notable for the following components:    Procalcitonin 0.57 (*)     All other components within normal limits   HEMOGLOBIN A1C - Abnormal; Notable for the following components:    Hemoglobin A1C 10.8 (*)     AVERAGE GLUCOSE 264 (*)     All other components within normal limits   POCT GLUCOSE - Abnormal; Notable for the following components:    POC Glucose 252 (*)     All other components within normal limits   LEGIONELLA ANTIGEN, URINE   CULTURE, RESPIRATORY    Narrative:     Source: Sputum       Site:           Current Antibiotics: not stated   STREP PNEUMONIAE ANTIGEN   TROPONIN   APTT   GLOMERULAR FILTRATION RATE, ESTIMATED   MAGNESIUM   OSMOLALITY   CBC WITH AUTO DIFFERENTIAL   COMPREHENSIVE METABOLIC PANEL W/ REFLEX TO MG FOR LOW K   C-REACTIVE PROTEIN   LACTATE DEHYDROGENASE   FERRITIN   D-DIMER, QUANTITATIVE   POCT GLUCOSE   POCT GLUCOSE       EMERGENCY DEPARTMENT COURSE:   Vitals:    Vitals:    09/13/21 1908 09/13/21 2100 09/13/21 2306 09/14/21 0344   BP: 133/69 (!) 147/82 134/82 136/79   Pulse: 94 95 92 84   Resp: 22 20 20 20   Temp:  97.7 °F (36.5 °C) 98.6 °F (37 °C) 97.7 °F (36.5 °C)   TempSrc:  Oral Oral Oral   SpO2: 95% 95% 94% 97%   Weight:       Height:         MDM    Patient was seen and evaluated in the emergency department, patient appeared to be in acute distress associated with shortness of breath. Vital signs obtained, significant hypoxia noted. Patient placed on oxygen. Tachycardia noted. Physical exam was completed, he had rhonchi throughout the lungs. Labs were obtained, COVID-19 was noted to be positive, significant elevation in D-dimer noted.   Chest x-ray reviewed, bilateral opacities noted. CT of the chest was ordered no PE noted. Discussed my findings and plan of care with the patient he is amenable with admission. Discussed the case with Dr. Tita Ken who accepts patient for admission.   Medications   aspirin EC tablet 81 mg (has no administration in time range)   lisinopril (PRINIVIL;ZESTRIL) tablet 5 mg (has no administration in time range)   sodium chloride flush 0.9 % injection 5-40 mL (10 mLs IntraVENous Given 9/13/21 2301)   sodium chloride flush 0.9 % injection 5-40 mL (has no administration in time range)   0.9 % sodium chloride infusion (has no administration in time range)   enoxaparin (LOVENOX) injection 30 mg (30 mg SubCUTAneous Given 9/13/21 2300)   ondansetron (ZOFRAN-ODT) disintegrating tablet 4 mg (has no administration in time range)     Or   ondansetron (ZOFRAN) injection 4 mg (has no administration in time range)   polyethylene glycol (GLYCOLAX) packet 17 g (has no administration in time range)   acetaminophen (TYLENOL) tablet 650 mg (has no administration in time range)     Or   acetaminophen (TYLENOL) suppository 650 mg (has no administration in time range)   guaiFENesin-dextromethorphan (ROBITUSSIN DM) 100-10 MG/5ML syrup 5 mL (has no administration in time range)   Dexamethasone Sodium Phosphate injection 6 mg (has no administration in time range)   insulin lispro (HUMALOG) injection vial 0-12 Units (has no administration in time range)   insulin lispro (HUMALOG) injection vial 0-6 Units (3 Units SubCUTAneous Given 9/14/21 0146)   glucose (GLUTOSE) 40 % oral gel 15 g (has no administration in time range)   dextrose 50 % IV solution (has no administration in time range)   glucagon (rDNA) injection 1 mg (has no administration in time range)   dextrose 5 % solution (has no administration in time range)   insulin glargine (LANTUS) injection vial 26 Units (26 Units SubCUTAneous Not Given 9/14/21 0202)   dexamethasone (DECADRON) tablet 6 mg (6 mg Oral Given 9/13/21 1524)   0.9 % sodium chloride bolus (0 mLs IntraVENous Stopped 9/13/21 1736)   azithromycin (ZITHROMAX) 500 mg in D5W 250ml addavial (0 mg IntraVENous Stopped 9/13/21 1902)   iopamidol (ISOVUE-370) 76 % injection 80 mL (80 mLs IntraVENous Given 9/13/21 1747)       Patient was seenindependently by myself. The patient's final impression and disposition and plan was determined by myself. CRITICAL CARE:   None    CONSULTS:  Hospitalist service    PROCEDURES:  None    FINAL IMPRESSION     1. Acute hypoxemic respiratory failure due to COVID-19 [facility])          DISPOSITION/PLAN   Patient mated to the hospital service  PATIENT REFERREDTO:  No follow-up provider specified. DISCHARGE MEDICATIONS:  Current Discharge Medication List          (Please note that portions of this note were completed with a voice recognition program.  Efforts were made to edit the dictations but occasionally words are mis-transcribed.)      Provider:  I personally performed the services described in the documentation,reviewed and edited the documentation which was dictated to the scribe in my presence, and it accurately records my words and actions.     Titus Purvis CNP 09/14/21 6:37 AM    Fabien Purvis, APRN - CNP        [employer], APRN - CNP  09/14/21 9799

## 2021-09-14 LAB
ALBUMIN SERPL-MCNC: 3 G/DL (ref 3.5–5.1)
ALP BLD-CCNC: 79 U/L (ref 38–126)
ALT SERPL-CCNC: 27 U/L (ref 11–66)
ANION GAP SERPL CALCULATED.3IONS-SCNC: 15 MEQ/L (ref 8–16)
AST SERPL-CCNC: 26 U/L (ref 5–40)
AVERAGE GLUCOSE: 264 MG/DL (ref 70–126)
BASOPHILS # BLD: 0.3 %
BASOPHILS ABSOLUTE: 0 THOU/MM3 (ref 0–0.1)
BILIRUB SERPL-MCNC: 0.6 MG/DL (ref 0.3–1.2)
BUN BLDV-MCNC: 18 MG/DL (ref 7–22)
C-REACTIVE PROTEIN: 30.96 MG/DL (ref 0–1)
CALCIUM SERPL-MCNC: 9.7 MG/DL (ref 8.5–10.5)
CHLORIDE BLD-SCNC: 97 MEQ/L (ref 98–111)
CO2: 24 MEQ/L (ref 23–33)
CREAT SERPL-MCNC: 0.8 MG/DL (ref 0.4–1.2)
D-DIMER QUANTITATIVE: 1331 NG/ML FEU (ref 0–500)
EOSINOPHIL # BLD: 0 %
EOSINOPHILS ABSOLUTE: 0 THOU/MM3 (ref 0–0.4)
ERYTHROCYTE [DISTWIDTH] IN BLOOD BY AUTOMATED COUNT: 12.1 % (ref 11.5–14.5)
ERYTHROCYTE [DISTWIDTH] IN BLOOD BY AUTOMATED COUNT: 39 FL (ref 35–45)
FERRITIN: 2680 NG/ML (ref 22–322)
GFR SERPL CREATININE-BSD FRML MDRD: > 90 ML/MIN/1.73M2
GLUCOSE BLD-MCNC: 252 MG/DL (ref 70–108)
GLUCOSE BLD-MCNC: 272 MG/DL (ref 70–108)
GLUCOSE BLD-MCNC: 301 MG/DL (ref 70–108)
GLUCOSE BLD-MCNC: 314 MG/DL (ref 70–108)
GLUCOSE BLD-MCNC: 322 MG/DL (ref 70–108)
GLUCOSE BLD-MCNC: 380 MG/DL (ref 70–108)
HBA1C MFR BLD: 10.8 % (ref 4.4–6.4)
HCT VFR BLD CALC: 47.3 % (ref 42–52)
HEMOGLOBIN: 16 GM/DL (ref 14–18)
IMMATURE GRANS (ABS): 0.04 THOU/MM3 (ref 0–0.07)
IMMATURE GRANULOCYTES: 1.2 %
LD: 339 U/L (ref 100–190)
LYMPHOCYTES # BLD: 15.1 %
LYMPHOCYTES ABSOLUTE: 0.5 THOU/MM3 (ref 1–4.8)
MCH RBC QN AUTO: 29.9 PG (ref 26–33)
MCHC RBC AUTO-ENTMCNC: 33.8 GM/DL (ref 32.2–35.5)
MCV RBC AUTO: 88.4 FL (ref 80–94)
MONOCYTES # BLD: 9.8 %
MONOCYTES ABSOLUTE: 0.3 THOU/MM3 (ref 0.4–1.3)
NUCLEATED RED BLOOD CELLS: 0 /100 WBC
PLATELET # BLD: 273 THOU/MM3 (ref 130–400)
PMV BLD AUTO: 10.8 FL (ref 9.4–12.4)
POTASSIUM REFLEX MAGNESIUM: 5.2 MEQ/L (ref 3.5–5.2)
RBC # BLD: 5.35 MILL/MM3 (ref 4.7–6.1)
SEG NEUTROPHILS: 73.6 %
SEGMENTED NEUTROPHILS ABSOLUTE COUNT: 2.5 THOU/MM3 (ref 1.8–7.7)
SODIUM BLD-SCNC: 136 MEQ/L (ref 135–145)
TOTAL PROTEIN: 7.1 G/DL (ref 6.1–8)
WBC # BLD: 3.4 THOU/MM3 (ref 4.8–10.8)

## 2021-09-14 PROCEDURE — 94761 N-INVAS EAR/PLS OXIMETRY MLT: CPT

## 2021-09-14 PROCEDURE — 82728 ASSAY OF FERRITIN: CPT

## 2021-09-14 PROCEDURE — 80053 COMPREHEN METABOLIC PANEL: CPT

## 2021-09-14 PROCEDURE — 2580000003 HC RX 258: Performed by: HOSPITALIST

## 2021-09-14 PROCEDURE — 6360000002 HC RX W HCPCS: Performed by: HOSPITALIST

## 2021-09-14 PROCEDURE — 6370000000 HC RX 637 (ALT 250 FOR IP): Performed by: HOSPITALIST

## 2021-09-14 PROCEDURE — 82948 REAGENT STRIP/BLOOD GLUCOSE: CPT

## 2021-09-14 PROCEDURE — 83615 LACTATE (LD) (LDH) ENZYME: CPT

## 2021-09-14 PROCEDURE — 85379 FIBRIN DEGRADATION QUANT: CPT

## 2021-09-14 PROCEDURE — 36415 COLL VENOUS BLD VENIPUNCTURE: CPT

## 2021-09-14 PROCEDURE — 1200000000 HC SEMI PRIVATE

## 2021-09-14 PROCEDURE — 1200000003 HC TELEMETRY R&B

## 2021-09-14 PROCEDURE — 85025 COMPLETE CBC W/AUTO DIFF WBC: CPT

## 2021-09-14 PROCEDURE — 86140 C-REACTIVE PROTEIN: CPT

## 2021-09-14 PROCEDURE — 99233 SBSQ HOSP IP/OBS HIGH 50: CPT | Performed by: HOSPITALIST

## 2021-09-14 PROCEDURE — 94669 MECHANICAL CHEST WALL OSCILL: CPT

## 2021-09-14 PROCEDURE — 2700000000 HC OXYGEN THERAPY PER DAY

## 2021-09-14 RX ORDER — INSULIN GLARGINE 100 [IU]/ML
30 INJECTION, SOLUTION SUBCUTANEOUS NIGHTLY
Status: DISCONTINUED | OUTPATIENT
Start: 2021-09-15 | End: 2021-09-16

## 2021-09-14 RX ORDER — INSULIN GLARGINE 100 [IU]/ML
0.25 INJECTION, SOLUTION SUBCUTANEOUS NIGHTLY
Status: DISCONTINUED | OUTPATIENT
Start: 2021-09-14 | End: 2021-09-14

## 2021-09-14 RX ADMIN — SODIUM CHLORIDE, PRESERVATIVE FREE 10 ML: 5 INJECTION INTRAVENOUS at 21:42

## 2021-09-14 RX ADMIN — ASPIRIN 81 MG: 81 TABLET, COATED ORAL at 08:37

## 2021-09-14 RX ADMIN — INSULIN LISPRO 6 UNITS: 100 INJECTION, SOLUTION INTRAVENOUS; SUBCUTANEOUS at 09:25

## 2021-09-14 RX ADMIN — DEXAMETHASONE SODIUM PHOSPHATE 6 MG: 4 INJECTION, SOLUTION INTRA-ARTICULAR; INTRALESIONAL; INTRAMUSCULAR; INTRAVENOUS; SOFT TISSUE at 16:03

## 2021-09-14 RX ADMIN — ENOXAPARIN SODIUM 30 MG: 30 INJECTION SUBCUTANEOUS at 08:37

## 2021-09-14 RX ADMIN — SODIUM CHLORIDE, PRESERVATIVE FREE 10 ML: 5 INJECTION INTRAVENOUS at 16:03

## 2021-09-14 RX ADMIN — ENOXAPARIN SODIUM 30 MG: 30 INJECTION SUBCUTANEOUS at 19:43

## 2021-09-14 RX ADMIN — INSULIN LISPRO 8 UNITS: 100 INJECTION, SOLUTION INTRAVENOUS; SUBCUTANEOUS at 12:33

## 2021-09-14 RX ADMIN — INSULIN LISPRO 5 UNITS: 100 INJECTION, SOLUTION INTRAVENOUS; SUBCUTANEOUS at 19:42

## 2021-09-14 RX ADMIN — SODIUM CHLORIDE, PRESERVATIVE FREE 10 ML: 5 INJECTION INTRAVENOUS at 08:37

## 2021-09-14 RX ADMIN — LISINOPRIL 5 MG: 5 TABLET ORAL at 08:37

## 2021-09-14 RX ADMIN — INSULIN LISPRO 3 UNITS: 100 INJECTION, SOLUTION INTRAVENOUS; SUBCUTANEOUS at 01:46

## 2021-09-14 RX ADMIN — INSULIN LISPRO 8 UNITS: 100 INJECTION, SOLUTION INTRAVENOUS; SUBCUTANEOUS at 17:50

## 2021-09-14 RX ADMIN — INSULIN GLARGINE 26 UNITS: 100 INJECTION, SOLUTION SUBCUTANEOUS at 19:42

## 2021-09-14 ASSESSMENT — PAIN SCALES - GENERAL
PAINLEVEL_OUTOF10: 0

## 2021-09-14 NOTE — PROGRESS NOTES
Patient provided with acapella and incentive spirometry and educated on use. Patient verbalizes understanding and can demonstrate how to correctly use both.

## 2021-09-14 NOTE — PROGRESS NOTES
Physician Progress Note      Ian Oneill  CSN #:                  [id number]  :                       1965  ADMIT DATE:       2021 3:02 PM  [address] [place] DATE:  RESPONDING  PROVIDER #:        Leo Dang MD          QUERY TEXT:    Pt admitted with Covid. Pt noted to have CTA with Extensive bilateral   groundglass infiltrates. If possible, please document in the progress notes   and discharge summary if you are evaluating and/or treating any of the   following: The medical record reflects the following:    Risk Factors: Covid  Clinical Indicators: CTA chest: Extensive bilateral groundglass infiltrates ,   CXR Bilateral peripheral infiltrates both upper and lower lungs, PCT 0.57  Treatment: IV Decadron, IV Zithromax x1, IVF    Thank you! Grover Duran, Richard Crocker, CRCR  RN Clinical   P: [phone]  Options provided:  -- Pneumonia due to Covid  -- Bacterial pneumonia  -- Atelectasis  -- Other - I will add my own diagnosis  -- Disagree - Not applicable / Not valid  -- Disagree - Clinically unable to determine / Unknown  -- Refer to Clinical Documentation Reviewer    PROVIDER RESPONSE TEXT:    This patient has pneumonia due to Covid.     Query created by: Petrona Doty on 2021 9:05 AM      Electronically signed by:  eLo Dang MD 2021 10:17 AM

## 2021-09-14 NOTE — CARE COORDINATION
9/14/21, 10:24 AM EDT  DISCHARGE PLANNING EVALUATION:    [place]       Admitted: 9/13/2021/ [facility] day: 1   Location: 8A-10/010-A Reason for admit: Acute hypoxemic respiratory failure due to COVID-19 ([facility] [address] [U07.1, J96.01]  COVID-19 virus infection [U07.1]   PMH:  has a past medical history of Hypertension and Type 2 diabetes mellitus without complication ([facility] Procedure: none  Barriers to Discharge:  CRP 30.96, -300s, Ddimer 1331, lactic acid 2.7.  92% on 2L. IV dexamethasone, lovenox. IS and acapella ordered. PCP: WOO Hendrix CNP  Readmission Risk Score: 9%    Patient Goals/Plan/Treatment Preferences: Met with Boom Marx, he plans to return home with his wife at discharge. [facility]. He did qualify for home oxygen, and would like  DME to provide. Boom Marx DME notified. Transportation/Food Security/Housekeeping Addressed:  No issues identified.

## 2021-09-14 NOTE — PROGRESS NOTES
Pt admitted to  8A10 in a wheelchair and from ED. Complaints: Shortness of breath. IV none infusing into the antecubital right, condition patent and no redness at a rate of 0 (no fluids ordered) mls/ hour with about 0 mls in the bag still. IV site free of s/s of infection or infiltration. Vital signs obtained. Assessment and data collection initiated. Skin assessment performed by Meredith Perkins RN. Oriented to room. Explained patients right to have family, representative or physician notified of their admission. Patient has N/A for physician to be notified. Patient has N/A for family/representative to be notified. The patient is interested in [facility]. Lists of hospitals in the [place] meds to beds program?:  No    Policies and procedures for [facility]K explained. All questions answered with no further questions at this time. Fall prevention and safety brochure discussed with patient. Bed alarm on. Call light in reach.

## 2021-09-14 NOTE — PROGRESS NOTES
A home oxygen evaluation has been completed. [x]Patient is an inpatient. It is expected that the patient will be discharged within the next 48 hours. Qualified provider to write order for home prescription if patient qualifies. Social service/care managers will arrange for home oxygen. If patient is active, arrange for Home Medical supplier to assess for Oxygen Conserving Device per pulse oximetry. []Patient is an outpatient. Results will be faxed to the ordering provider. Qualified provider to write order for home prescription if patient qualifies and arranges for home oxygen. Patient was placed on room air for 3 minutes. SpO2 was 87-88 % on room air at rest. Patients SpO2 was below 89% and qualified for home oxygen. Oxygen was applied at 2 lpm via nasal cannula to maintain a SpO2 between 90-92% while at rest. Actual SpO2 was 90-91 %. Patient able to ambulate for exercise flow rate. Patients was ambulated, SpO2 was 90-91% on 6 lpm to maintain SpO2 between 90-92% while exercising. If oxygen need is greater than 4 lpm the SpO2 on 4 lpm was 88%. Note: For any SpO2 at 91% see policy and procedure for possible qualifications.

## 2021-09-15 LAB
ANION GAP SERPL CALCULATED.3IONS-SCNC: 13 MEQ/L (ref 8–16)
BASOPHILS # BLD: 0 %
BASOPHILS ABSOLUTE: 0 THOU/MM3 (ref 0–0.1)
BUN BLDV-MCNC: 28 MG/DL (ref 7–22)
C-REACTIVE PROTEIN: 11.76 MG/DL (ref 0–1)
CALCIUM SERPL-MCNC: 9.3 MG/DL (ref 8.5–10.5)
CHLORIDE BLD-SCNC: 98 MEQ/L (ref 98–111)
CO2: 23 MEQ/L (ref 23–33)
CREAT SERPL-MCNC: 0.7 MG/DL (ref 0.4–1.2)
D-DIMER QUANTITATIVE: 858 NG/ML FEU (ref 0–500)
EOSINOPHIL # BLD: 0 %
EOSINOPHILS ABSOLUTE: 0 THOU/MM3 (ref 0–0.4)
ERYTHROCYTE [DISTWIDTH] IN BLOOD BY AUTOMATED COUNT: 11.9 % (ref 11.5–14.5)
ERYTHROCYTE [DISTWIDTH] IN BLOOD BY AUTOMATED COUNT: 38.5 FL (ref 35–45)
FERRITIN: 2532 NG/ML (ref 22–322)
GFR SERPL CREATININE-BSD FRML MDRD: > 90 ML/MIN/1.73M2
GLUCOSE BLD-MCNC: 275 MG/DL (ref 70–108)
GLUCOSE BLD-MCNC: 290 MG/DL (ref 70–108)
GLUCOSE BLD-MCNC: 293 MG/DL (ref 70–108)
GLUCOSE BLD-MCNC: 335 MG/DL (ref 70–108)
GLUCOSE BLD-MCNC: 381 MG/DL (ref 70–108)
GRAM STAIN RESULT: NORMAL
HCT VFR BLD CALC: 40.7 % (ref 42–52)
HEMOGLOBIN: 14.1 GM/DL (ref 14–18)
IMMATURE GRANS (ABS): 0.06 THOU/MM3 (ref 0–0.07)
IMMATURE GRANULOCYTES: 0.8 %
LD: 309 U/L (ref 100–190)
LEGIONELLA PNEUMOPHILIA AG, URINE: NEGATIVE
LYMPHOCYTES # BLD: 7 %
LYMPHOCYTES ABSOLUTE: 0.5 THOU/MM3 (ref 1–4.8)
MCH RBC QN AUTO: 30.3 PG (ref 26–33)
MCHC RBC AUTO-ENTMCNC: 34.6 GM/DL (ref 32.2–35.5)
MCV RBC AUTO: 87.3 FL (ref 80–94)
MONOCYTES # BLD: 6 %
MONOCYTES ABSOLUTE: 0.4 THOU/MM3 (ref 0.4–1.3)
NUCLEATED RED BLOOD CELLS: 0 /100 WBC
PLATELET # BLD: 277 THOU/MM3 (ref 130–400)
PMV BLD AUTO: 11 FL (ref 9.4–12.4)
POTASSIUM SERPL-SCNC: 4.9 MEQ/L (ref 3.5–5.2)
PROCALCITONIN: 0.33 NG/ML (ref 0.01–0.09)
RBC # BLD: 4.66 MILL/MM3 (ref 4.7–6.1)
RESPIRATORY CULTURE: NORMAL
SEG NEUTROPHILS: 86.2 %
SEGMENTED NEUTROPHILS ABSOLUTE COUNT: 6.3 THOU/MM3 (ref 1.8–7.7)
SODIUM BLD-SCNC: 134 MEQ/L (ref 135–145)
STREP PNEUMO AG, UR: NEGATIVE
WBC # BLD: 7.3 THOU/MM3 (ref 4.8–10.8)

## 2021-09-15 PROCEDURE — 6360000002 HC RX W HCPCS: Performed by: HOSPITALIST

## 2021-09-15 PROCEDURE — 86140 C-REACTIVE PROTEIN: CPT

## 2021-09-15 PROCEDURE — 83615 LACTATE (LD) (LDH) ENZYME: CPT

## 2021-09-15 PROCEDURE — 36415 COLL VENOUS BLD VENIPUNCTURE: CPT

## 2021-09-15 PROCEDURE — 2580000003 HC RX 258: Performed by: HOSPITALIST

## 2021-09-15 PROCEDURE — 85379 FIBRIN DEGRADATION QUANT: CPT

## 2021-09-15 PROCEDURE — 1200000000 HC SEMI PRIVATE

## 2021-09-15 PROCEDURE — 99232 SBSQ HOSP IP/OBS MODERATE 35: CPT | Performed by: HOSPITALIST

## 2021-09-15 PROCEDURE — 82948 REAGENT STRIP/BLOOD GLUCOSE: CPT

## 2021-09-15 PROCEDURE — 82728 ASSAY OF FERRITIN: CPT

## 2021-09-15 PROCEDURE — 85025 COMPLETE CBC W/AUTO DIFF WBC: CPT

## 2021-09-15 PROCEDURE — 6370000000 HC RX 637 (ALT 250 FOR IP): Performed by: HOSPITALIST

## 2021-09-15 PROCEDURE — 80048 BASIC METABOLIC PNL TOTAL CA: CPT

## 2021-09-15 PROCEDURE — 84145 PROCALCITONIN (PCT): CPT

## 2021-09-15 RX ADMIN — INSULIN LISPRO 5 UNITS: 100 INJECTION, SOLUTION INTRAVENOUS; SUBCUTANEOUS at 20:14

## 2021-09-15 RX ADMIN — ENOXAPARIN SODIUM 30 MG: 30 INJECTION SUBCUTANEOUS at 08:34

## 2021-09-15 RX ADMIN — INSULIN LISPRO 6 UNITS: 100 INJECTION, SOLUTION INTRAVENOUS; SUBCUTANEOUS at 18:19

## 2021-09-15 RX ADMIN — INSULIN LISPRO 6 UNITS: 100 INJECTION, SOLUTION INTRAVENOUS; SUBCUTANEOUS at 12:55

## 2021-09-15 RX ADMIN — SODIUM CHLORIDE, PRESERVATIVE FREE 10 ML: 5 INJECTION INTRAVENOUS at 20:14

## 2021-09-15 RX ADMIN — ASPIRIN 81 MG: 81 TABLET, COATED ORAL at 08:34

## 2021-09-15 RX ADMIN — INSULIN LISPRO 6 UNITS: 100 INJECTION, SOLUTION INTRAVENOUS; SUBCUTANEOUS at 10:01

## 2021-09-15 RX ADMIN — SODIUM CHLORIDE, PRESERVATIVE FREE 10 ML: 5 INJECTION INTRAVENOUS at 08:34

## 2021-09-15 RX ADMIN — GUAIFENESIN AND DEXTROMETHORPHAN 5 ML: 100; 10 SYRUP ORAL at 17:50

## 2021-09-15 RX ADMIN — INSULIN GLARGINE 30 UNITS: 100 INJECTION, SOLUTION SUBCUTANEOUS at 20:23

## 2021-09-15 RX ADMIN — ENOXAPARIN SODIUM 30 MG: 30 INJECTION SUBCUTANEOUS at 20:14

## 2021-09-15 RX ADMIN — GUAIFENESIN AND DEXTROMETHORPHAN 5 ML: 100; 10 SYRUP ORAL at 13:03

## 2021-09-15 RX ADMIN — LISINOPRIL 5 MG: 5 TABLET ORAL at 08:34

## 2021-09-15 RX ADMIN — DEXAMETHASONE SODIUM PHOSPHATE 6 MG: 4 INJECTION, SOLUTION INTRA-ARTICULAR; INTRALESIONAL; INTRAMUSCULAR; INTRAVENOUS; SOFT TISSUE at 15:57

## 2021-09-15 ASSESSMENT — PAIN SCALES - GENERAL
PAINLEVEL_OUTOF10: 0
PAINLEVEL_OUTOF10: 3
PAINLEVEL_OUTOF10: 0

## 2021-09-15 ASSESSMENT — PAIN DESCRIPTION - PROGRESSION: CLINICAL_PROGRESSION: GRADUALLY WORSENING

## 2021-09-15 ASSESSMENT — PAIN DESCRIPTION - PAIN TYPE: TYPE: ACUTE PAIN

## 2021-09-15 ASSESSMENT — PAIN DESCRIPTION - ORIENTATION: ORIENTATION: RIGHT;LEFT

## 2021-09-15 ASSESSMENT — PAIN DESCRIPTION - ONSET: ONSET: ON-GOING

## 2021-09-15 ASSESSMENT — PAIN - FUNCTIONAL ASSESSMENT: PAIN_FUNCTIONAL_ASSESSMENT: ACTIVITIES ARE NOT PREVENTED

## 2021-09-15 ASSESSMENT — PAIN DESCRIPTION - DESCRIPTORS: DESCRIPTORS: ACHING

## 2021-09-15 ASSESSMENT — PAIN DESCRIPTION - LOCATION: LOCATION: HEAD

## 2021-09-15 ASSESSMENT — PAIN DESCRIPTION - FREQUENCY: FREQUENCY: CONTINUOUS

## 2021-09-15 NOTE — CARE COORDINATION
9/15/21, 3:09 PM EDT    DISCHARGE ON GOING [facility] day: 2  Location: 8A-10/010-A Reason for admit: Acute hypoxemic respiratory failure due [facility] [U07.1, J96.01]  COVID-19 virus infection [U07.1]   Barriers to Discharge: Dexamethasone, Lovenox, ISS, prn medications, daily C-reactive Protein and D-Dimer, Oxygen, Acapella, incentive spirometry, droplet plus isolation, up as tolerated. PCP: WOO Vasquez CNP  Readmission Risk Score: 11%  Patient Goals/Plan/Treatment Preferences: Ryan Dominguez is from home with his wife. Monitoring for home oxygen.

## 2021-09-15 NOTE — PROGRESS NOTES
Hospitalist Progress Note    Patient:  Kp Mckeon      Unit/Bed:8A-10/010-A    YOB: 1965    MRN: [id number]       Acct: [de-identified]     PCP: WOO Carney CNP    Date of Admission: 9/13/2021      Subjective: States he is eating well and sleeping well. Patient states he feels okay. Patient denies have any shortness of breath. No fever, no chills, no nausea, no vomiting. Medications:  Reviewed    Infusion Medications    sodium chloride      dextrose       Scheduled Medications    insulin glargine  0.25 Units/kg SubCUTAneous Nightly    aspirin EC  81 mg Oral Daily    lisinopril  5 mg Oral Daily    sodium chloride flush  5-40 mL IntraVENous 2 times per day    enoxaparin  30 mg SubCUTAneous BID    dexamethasone  6 mg IntraVENous Q24H    insulin lispro  0-12 Units SubCUTAneous TID WC    insulin lispro  0-6 Units SubCUTAneous Nightly     PRN Meds: sodium chloride flush, sodium chloride, ondansetron **OR** ondansetron, polyethylene glycol, acetaminophen **OR** acetaminophen, guaiFENesin-dextromethorphan, glucose, dextrose, glucagon (rDNA), dextrose      Intake/Output Summary (Last 24 hours) at 9/14/2021 2319  Last data filed at 9/14/2021 2142  Gross per 24 hour   Intake 2420 ml   Output 0 ml   Net 2420 ml       Diet:  ADULT DIET; Regular; 4 carb choices (60 gm/meal)    Exam:  BP (!) 141/86   Pulse 94   Temp 98.3 °F (36.8 °C) (Oral)   Resp 18   Ht 5' 11\" (1.803 m)   Wt 225 lb (102.1 kg)   SpO2 94%   BMI 31.38 kg/m²   Physical Exam  Constitutional:       Appearance: Normal appearance. HENT:      Head: Normocephalic and atraumatic. Right Ear: External ear normal.      Left Ear: External ear normal.      Nose: Nose normal.      Mouth/Throat:      Pharynx: Oropharynx is clear. Eyes:      Extraocular Movements: Extraocular movements intact. Pupils: Pupils are equal, round, and reactive to light.    Cardiovascular:      Rate and Rhythm: Normal rate and regular rhythm. Pulses: Normal pulses. Heart sounds: Normal heart sounds. No murmur heard. No friction rub. No gallop. Pulmonary:      Effort: Pulmonary effort is normal. No respiratory distress. Breath sounds: Normal breath sounds. No wheezing, rhonchi or rales. Abdominal:      General: Bowel sounds are normal. There is no distension. Tenderness: There is no abdominal tenderness. Musculoskeletal:         General: No swelling. Normal range of motion. Cervical back: Normal range of motion and neck supple. Skin:     General: Skin is warm. Neurological:      General: No focal deficit present. Mental Status: He is alert. Labs:   Recent Labs     09/13/21  1520 09/14/21  0622   WBC 6.9 3.4*   HGB 15.0 16.0   HCT 43.1 47.3    273     Recent Labs     09/13/21  1520 09/14/21  0622   * 136   K 3.5 5.2   CL 95* 97*   CO2 20* 24   BUN 14 18   CREATININE 0.8 0.8   CALCIUM 9.2 9.7     Recent Labs     09/13/21  1520 09/14/21  0622   AST 26 26   ALT 24 27   BILITOT 0.6 0.6   ALKPHOS 77 79     Recent Labs     09/13/21  1520   INR 1.29*     No results for input(s): Kimberly Tolu in the last 72 hours. Urinalysis:    No results found for: Mikki Dose, BACTERIA, RBCUA, BLOODU, SPECGRAV, [address]    Radiology:  CTA CHEST W WO CONTRAST   Final Result   Extensive bilateral groundglass infiltrates no PE            **This report has been created using voice recognition software. It may contain minor errors which are inherent in voice recognition technology. **      Final report electronically signed by Dr. Chinyere Demarco on 9/13/2021 6:05 PM      XR CHEST PORTABLE   Final Result   Bilateral peripheral infiltrates both upper and lower lungs            **This report has been created using voice recognition software. It may contain minor errors which are inherent in voice recognition technology. **      Final report electronically signed by Dr. Chinyere Demarco on 9/13/2021 3:47 PM Diet: ADULT DIET; Regular; 4 carb choices (60 gm/meal)    DVT prophylaxis: [x] Lovenox                                 [] SCDs                                 [] SQ Heparin                                 [] Encourage ambulation           [] Already on Anticoagulation     Disposition:    [x] Home       [] TCU       [] Rehab       [] Psych       [] SNF       [] [facility]       [] Other-    Code Status: Full Code    PT/OT Eval Status: None ordered    Assessment/Plan:    Anticipated Discharge in : 1 to 2 days    Active Hospital Problems    Diagnosis Date Noted    COVID-19 virus infection [U07.1] 09/13/2021     Priority: High    Acute respiratory failure with hypoxia ([facility] [address] [J96.01] 09/13/2021     Priority: High    Type 2 diabetes mellitus without complication, without long-term current use of insulin [facility] [E11.9] 09/13/2021     Priority: Low    Essential hypertension [I10] 04/11/2017     Priority: Low       1. Acute respiratory failure with hypoxia-patient presents with shortness of breath x2 weeks. Patient found to be hypoxic in the ED with an O2 saturation of 87% on room air. Patient placed on 2 L of oxygen with improvement of oxygen to 94%. Patient will be weaned off of oxygen as able. · 9/14/2021-patient ambulated and found to need 6 L of oxygen. Patient needed 2 L of oxygen at rest.  2. Pneumonia secondary COVID-19 infection-patient reports that he has had symptoms for the past 2 weeks. Patient tested positive for COVID-19 on 9/13/2021. As patient has had symptoms for the past 2 weeks, patient will not be started on remdesivir. Patient will be placed on dexamethasone. We will also give patient vitamin supplements with zinc, vitamin C and vitamin D. We will follow patient's inflammatory markers during hospitalization. 3. Essential hypertension-patient with history of hypertension. Patient will be placed on his home blood pressure medications.   4. Type 2 diabetes-patient with history of type 2 diabetes. Patient's oral medications will be held for now. Patient will be placed on Lantus weight-based. Dexamethasone will most likely cause patient's blood sugars to go up. Patient will also be placed on insulin sliding scale.   · 9/14/2021-Lantus changed to 30 units daily        Electronically signed by Tracey Ha MD on 9/14/2021 at 11:19 PM

## 2021-09-15 NOTE — PROGRESS NOTES
I walked the patient up and down the hallway for 6 minutes and the patient required 6L with ambulation.

## 2021-09-16 LAB
C-REACTIVE PROTEIN: 4.25 MG/DL (ref 0–1)
D-DIMER QUANTITATIVE: 819 NG/ML FEU (ref 0–500)
GLUCOSE BLD-MCNC: 233 MG/DL (ref 70–108)
GLUCOSE BLD-MCNC: 253 MG/DL (ref 70–108)
GLUCOSE BLD-MCNC: 256 MG/DL (ref 70–108)
GLUCOSE BLD-MCNC: 262 MG/DL (ref 70–108)

## 2021-09-16 PROCEDURE — 6370000000 HC RX 637 (ALT 250 FOR IP): Performed by: HOSPITALIST

## 2021-09-16 PROCEDURE — 99233 SBSQ HOSP IP/OBS HIGH 50: CPT | Performed by: HOSPITALIST

## 2021-09-16 PROCEDURE — 82948 REAGENT STRIP/BLOOD GLUCOSE: CPT

## 2021-09-16 PROCEDURE — 6360000002 HC RX W HCPCS: Performed by: HOSPITALIST

## 2021-09-16 PROCEDURE — 1200000000 HC SEMI PRIVATE

## 2021-09-16 PROCEDURE — 2580000003 HC RX 258: Performed by: HOSPITALIST

## 2021-09-16 PROCEDURE — 36415 COLL VENOUS BLD VENIPUNCTURE: CPT

## 2021-09-16 PROCEDURE — 86140 C-REACTIVE PROTEIN: CPT

## 2021-09-16 PROCEDURE — 85379 FIBRIN DEGRADATION QUANT: CPT

## 2021-09-16 RX ORDER — INSULIN GLARGINE 100 [IU]/ML
25 INJECTION, SOLUTION SUBCUTANEOUS 2 TIMES DAILY
Status: DISCONTINUED | OUTPATIENT
Start: 2021-09-17 | End: 2021-09-18 | Stop reason: HOSPADM

## 2021-09-16 RX ADMIN — INSULIN LISPRO 6 UNITS: 100 INJECTION, SOLUTION INTRAVENOUS; SUBCUTANEOUS at 17:22

## 2021-09-16 RX ADMIN — GUAIFENESIN AND DEXTROMETHORPHAN 5 ML: 100; 10 SYRUP ORAL at 05:17

## 2021-09-16 RX ADMIN — ENOXAPARIN SODIUM 30 MG: 30 INJECTION SUBCUTANEOUS at 08:50

## 2021-09-16 RX ADMIN — DEXAMETHASONE SODIUM PHOSPHATE 6 MG: 4 INJECTION, SOLUTION INTRA-ARTICULAR; INTRALESIONAL; INTRAMUSCULAR; INTRAVENOUS; SOFT TISSUE at 15:56

## 2021-09-16 RX ADMIN — INSULIN LISPRO 4 UNITS: 100 INJECTION, SOLUTION INTRAVENOUS; SUBCUTANEOUS at 08:49

## 2021-09-16 RX ADMIN — ASPIRIN 81 MG: 81 TABLET, COATED ORAL at 08:50

## 2021-09-16 RX ADMIN — ENOXAPARIN SODIUM 30 MG: 30 INJECTION SUBCUTANEOUS at 21:19

## 2021-09-16 RX ADMIN — LISINOPRIL 5 MG: 5 TABLET ORAL at 08:50

## 2021-09-16 RX ADMIN — INSULIN LISPRO 3 UNITS: 100 INJECTION, SOLUTION INTRAVENOUS; SUBCUTANEOUS at 21:19

## 2021-09-16 RX ADMIN — INSULIN LISPRO 6 UNITS: 100 INJECTION, SOLUTION INTRAVENOUS; SUBCUTANEOUS at 11:28

## 2021-09-16 RX ADMIN — SODIUM CHLORIDE, PRESERVATIVE FREE 10 ML: 5 INJECTION INTRAVENOUS at 21:18

## 2021-09-16 RX ADMIN — INSULIN GLARGINE 30 UNITS: 100 INJECTION, SOLUTION SUBCUTANEOUS at 21:20

## 2021-09-16 ASSESSMENT — PAIN SCALES - GENERAL
PAINLEVEL_OUTOF10: 0

## 2021-09-16 NOTE — PROGRESS NOTES
A home oxygen evaluation has been completed. []Patient is an inpatient. It is expected that the patient will be discharged within the next 48 hours. Qualified provider to write order for home prescription if patient qualifies. Social service/care managers will arrange for home oxygen. If patient is active, arrange for Home Medical supplier to assess for Oxygen Conserving Device per pulse oximetry. []Patient is an outpatient. Results will be faxed to the ordering provider. Qualified provider to write order for home prescription if patient qualifies and arranges for home oxygen. Patient was placed on room air for 5 minutes. SpO2 was 91% on room air at rest. Patients SpO2 was 89% or above and did not qualify for home oxygen. Patient was walked for 6  minutes. SpO2 was 85 % during walking. Patients SpO2 was below 89% and qualified for home oxygen. Oxygen was applied at 8lpm via nasal cannula to maintain a SpO2 between 90-92% while walking. Actual SpO2 was 90 %. If oxygen need is greater than 4 lpm the SpO2 on 4 lpm was 88. Note: For any SpO2 at 48% see policy and procedure for possible qualifications.

## 2021-09-16 NOTE — PROGRESS NOTES
Patient's daughter, Lester called and updated this morning on patient's condition. No further questions at this time.

## 2021-09-16 NOTE — PROGRESS NOTES
Hospitalist Progress Note    Patient:  Shakir Mckeon      Unit/Bed:8A-10/010-A    YOB: 1965    MRN: [id number]       Acct: [de-identified]     PCP: WOO Monaco CNP    Date of Admission: 9/13/2021      Subjective: Patient seen and examined. Patient states that he is feeling well. Patient states that his breathing feels better. Medications:  Reviewed    Infusion Medications    sodium chloride      dextrose       Scheduled Medications    insulin glargine  30 Units SubCUTAneous Nightly    aspirin EC  81 mg Oral Daily    lisinopril  5 mg Oral Daily    sodium chloride flush  5-40 mL IntraVENous 2 times per day    enoxaparin  30 mg SubCUTAneous BID    dexamethasone  6 mg IntraVENous Q24H    insulin lispro  0-12 Units SubCUTAneous TID WC    insulin lispro  0-6 Units SubCUTAneous Nightly     PRN Meds: sodium chloride flush, sodium chloride, ondansetron **OR** ondansetron, polyethylene glycol, acetaminophen **OR** acetaminophen, guaiFENesin-dextromethorphan, glucose, dextrose, glucagon (rDNA), dextrose      Intake/Output Summary (Last 24 hours) at 9/15/2021 2155  Last data filed at 9/15/2021 1128  Gross per 24 hour   Intake 710 ml   Output --   Net 710 ml       Diet:  ADULT DIET; Regular; 4 carb choices (60 gm/meal)    Exam:  BP (!) 148/90   Pulse 77   Temp 98 °F (36.7 °C) (Oral)   Resp 18   Ht 5' 11\" (1.803 m)   Wt 225 lb (102.1 kg)   SpO2 93%   BMI 31.38 kg/m²   Physical Exam  Constitutional:       Appearance: Normal appearance. HENT:      Head: Normocephalic and atraumatic. Right Ear: External ear normal.      Left Ear: External ear normal.      Nose: Nose normal.      Mouth/Throat:      Pharynx: Oropharynx is clear. Eyes:      Extraocular Movements: Extraocular movements intact. Pupils: Pupils are equal, round, and reactive to light. Cardiovascular:      Rate and Rhythm: Normal rate and regular rhythm. Pulses: Normal pulses.       Heart sounds: Normal heart sounds. No murmur heard. No friction rub. No gallop. Pulmonary:      Effort: Pulmonary effort is normal. No respiratory distress. Breath sounds: Normal breath sounds. No wheezing, rhonchi or rales. Abdominal:      General: Bowel sounds are normal. There is no distension. Tenderness: There is no abdominal tenderness. Musculoskeletal:         General: No swelling. Normal range of motion. Cervical back: Normal range of motion and neck supple. Skin:     General: Skin is warm. Neurological:      General: No focal deficit present. Mental Status: He is alert. Labs:   Recent Labs     09/13/21  1520 09/14/21  0622 09/15/21  0554   WBC 6.9 3.4* 7.3   HGB 15.0 16.0 14.1   HCT 43.1 47.3 40.7*    273 277     Recent Labs     09/13/21  1520 09/14/21  0622 09/15/21  0554   * 136 134*   K 3.5 5.2 4.9   CL 95* 97* 98   CO2 20* 24 23   BUN 14 18 28*   CREATININE 0.8 0.8 0.7   CALCIUM 9.2 9.7 9.3     Recent Labs     09/13/21  1520 09/14/21  0622   AST 26 26   ALT 24 27   BILITOT 0.6 0.6   ALKPHOS 77 79     Recent Labs     09/13/21  1520   INR 1.29*     No results for input(s): Margette Dec in the last 72 hours. Urinalysis:    No results found for: McNeil Harada, BACTERIA, RBCUA, BLOODU, SPECGRAV, [address]    Radiology:  CTA CHEST W WO CONTRAST   Final Result   Extensive bilateral groundglass infiltrates no PE            **This report has been created using voice recognition software. It may contain minor errors which are inherent in voice recognition technology. **      Final report electronically signed by Dr. Donald Nguyen on 9/13/2021 6:05 PM      XR CHEST PORTABLE   Final Result   Bilateral peripheral infiltrates both upper and lower lungs            **This report has been created using voice recognition software. It may contain minor errors which are inherent in voice recognition technology. **      Final report electronically signed by Dr. Donald Nguyen on hospitalization. · 9/15/2021-inflammatory markers including CRP, LDH and ferritin trending down  3. Essential hypertension-patient with history of hypertension. Patient will be placed on his home blood pressure medications. 4. Type 2 diabetes-patient with history of type 2 diabetes. Patient's oral medications will be held for now. Patient will be placed on Lantus weight-based. Dexamethasone will most likely cause patient's blood sugars to go up. Patient will also be placed on insulin sliding scale.   · 9/14/2021-Lantus changed to 30 units nightly        Electronically signed by Mariam Adams MD on 9/15/2021 at 9:55 PM

## 2021-09-16 NOTE — CARE COORDINATION
9/16/21, 3:09 PM EDT    DISCHARGE ON GOING [facility] day: 3  Location: 8A-10/010-A Reason for admit: Acute hypoxemic respiratory failure due to COVID-19 ([facility] [address] [U07.1, J96.01]  COVID-19 virus infection [U07.1]   Barriers to Discharge: Dexamethasone, Lovenox, DM management, prn medications, oxygen (patient required 8L with ambulation), carb choice diet, acapella, incentive spirometry, droplet plus isolation, up as tolerated. PCP: WOO Michel CNP  Readmission Risk Score: 11%  Patient Goals/Plan/Treatment Preferences: Raghav Reynolds is from home with his wife. [facility]) DME for oxygen at discharge.

## 2021-09-17 ENCOUNTER — APPOINTMENT (OUTPATIENT)
Dept: GENERAL RADIOLOGY | Age: 56
DRG: 177 | End: 2021-09-17
Payer: COMMERCIAL

## 2021-09-17 LAB
C-REACTIVE PROTEIN: 2.04 MG/DL (ref 0–1)
FERRITIN: 1298 NG/ML (ref 22–322)
GLUCOSE BLD-MCNC: 149 MG/DL (ref 70–108)
GLUCOSE BLD-MCNC: 188 MG/DL (ref 70–108)
GLUCOSE BLD-MCNC: 222 MG/DL (ref 70–108)
GLUCOSE BLD-MCNC: 320 MG/DL (ref 70–108)
LD: 240 U/L (ref 100–190)

## 2021-09-17 PROCEDURE — 6370000000 HC RX 637 (ALT 250 FOR IP): Performed by: HOSPITALIST

## 2021-09-17 PROCEDURE — 2580000003 HC RX 258: Performed by: HOSPITALIST

## 2021-09-17 PROCEDURE — 99232 SBSQ HOSP IP/OBS MODERATE 35: CPT | Performed by: HOSPITALIST

## 2021-09-17 PROCEDURE — 6360000002 HC RX W HCPCS: Performed by: HOSPITALIST

## 2021-09-17 PROCEDURE — 86140 C-REACTIVE PROTEIN: CPT

## 2021-09-17 PROCEDURE — 36415 COLL VENOUS BLD VENIPUNCTURE: CPT

## 2021-09-17 PROCEDURE — 83615 LACTATE (LD) (LDH) ENZYME: CPT

## 2021-09-17 PROCEDURE — 82948 REAGENT STRIP/BLOOD GLUCOSE: CPT

## 2021-09-17 PROCEDURE — 71045 X-RAY EXAM CHEST 1 VIEW: CPT

## 2021-09-17 PROCEDURE — 82728 ASSAY OF FERRITIN: CPT

## 2021-09-17 PROCEDURE — 1200000000 HC SEMI PRIVATE

## 2021-09-17 RX ADMIN — ASPIRIN 81 MG: 81 TABLET, COATED ORAL at 09:19

## 2021-09-17 RX ADMIN — ENOXAPARIN SODIUM 30 MG: 30 INJECTION SUBCUTANEOUS at 09:20

## 2021-09-17 RX ADMIN — SODIUM CHLORIDE, PRESERVATIVE FREE 10 ML: 5 INJECTION INTRAVENOUS at 09:20

## 2021-09-17 RX ADMIN — INSULIN GLARGINE 25 UNITS: 100 INJECTION, SOLUTION SUBCUTANEOUS at 09:20

## 2021-09-17 RX ADMIN — SODIUM CHLORIDE, PRESERVATIVE FREE 10 ML: 5 INJECTION INTRAVENOUS at 21:17

## 2021-09-17 RX ADMIN — DEXAMETHASONE SODIUM PHOSPHATE 6 MG: 4 INJECTION, SOLUTION INTRA-ARTICULAR; INTRALESIONAL; INTRAMUSCULAR; INTRAVENOUS; SOFT TISSUE at 15:35

## 2021-09-17 RX ADMIN — ENOXAPARIN SODIUM 30 MG: 30 INJECTION SUBCUTANEOUS at 21:16

## 2021-09-17 RX ADMIN — LISINOPRIL 5 MG: 5 TABLET ORAL at 09:19

## 2021-09-17 RX ADMIN — INSULIN LISPRO 2 UNITS: 100 INJECTION, SOLUTION INTRAVENOUS; SUBCUTANEOUS at 18:17

## 2021-09-17 RX ADMIN — INSULIN LISPRO 2 UNITS: 100 INJECTION, SOLUTION INTRAVENOUS; SUBCUTANEOUS at 09:19

## 2021-09-17 RX ADMIN — INSULIN GLARGINE 25 UNITS: 100 INJECTION, SOLUTION SUBCUTANEOUS at 21:16

## 2021-09-17 RX ADMIN — INSULIN LISPRO 4 UNITS: 100 INJECTION, SOLUTION INTRAVENOUS; SUBCUTANEOUS at 21:16

## 2021-09-17 RX ADMIN — INSULIN LISPRO 4 UNITS: 100 INJECTION, SOLUTION INTRAVENOUS; SUBCUTANEOUS at 13:52

## 2021-09-17 ASSESSMENT — PAIN SCALES - GENERAL
PAINLEVEL_OUTOF10: 0

## 2021-09-17 NOTE — PROGRESS NOTES
Hospitalist Progress Note    Patient:  Savannah Mckeon      Unit/Bed:8A-10/010-A    YOB: 1965    MRN: [id number]       Acct: [de-identified]     PCP: WOO Michel CNP    Date of Admission: 9/13/2021      Subjective: Seen and examined. Patient was evaluated for home oxygen. Patient found to be on room air at rest.  On ambulation, patient needed 6 L of oxygen in order to maintain an oxygen saturation between 90 and 92%. Patient states that he feels well. Patient states he is eating and sleeping well. No fever, no chills, no nausea, no vomiting. Medications:  Reviewed    Infusion Medications    sodium chloride      dextrose       Scheduled Medications    insulin glargine  30 Units SubCUTAneous Nightly    aspirin EC  81 mg Oral Daily    lisinopril  5 mg Oral Daily    sodium chloride flush  5-40 mL IntraVENous 2 times per day    enoxaparin  30 mg SubCUTAneous BID    dexamethasone  6 mg IntraVENous Q24H    insulin lispro  0-12 Units SubCUTAneous TID WC    insulin lispro  0-6 Units SubCUTAneous Nightly     PRN Meds: sodium chloride flush, sodium chloride, ondansetron **OR** ondansetron, polyethylene glycol, acetaminophen **OR** acetaminophen, guaiFENesin-dextromethorphan, glucose, dextrose, glucagon (rDNA), dextrose      Intake/Output Summary (Last 24 hours) at 9/16/2021 2231  Last data filed at 9/16/2021 2117  Gross per 24 hour   Intake 2060 ml   Output --   Net 2060 ml       Diet:  ADULT DIET; Regular; 4 carb choices (60 gm/meal)    Exam:  BP (!) 141/93   Pulse 71   Temp 97.3 °F (36.3 °C) (Oral)   Resp 18   Ht 5' 11\" (1.803 m)   Wt 225 lb (102.1 kg)   SpO2 93%   BMI 31.38 kg/m²   Physical Exam  Constitutional:       Appearance: Normal appearance. HENT:      Head: Normocephalic and atraumatic. Right Ear: External ear normal.      Left Ear: External ear normal.      Nose: Nose normal.      Mouth/Throat:      Pharynx: Oropharynx is clear.    Eyes:      Extraocular Movements: Extraocular movements intact. Pupils: Pupils are equal, round, and reactive to light. Cardiovascular:      Rate and Rhythm: Normal rate and regular rhythm. Pulses: Normal pulses. Heart sounds: Normal heart sounds. No murmur heard. No friction rub. No gallop. Pulmonary:      Effort: Pulmonary effort is normal. No respiratory distress. Breath sounds: Normal breath sounds. No wheezing, rhonchi or rales. Abdominal:      General: Bowel sounds are normal. There is no distension. Tenderness: There is no abdominal tenderness. Musculoskeletal:         General: No swelling. Normal range of motion. Cervical back: Normal range of motion and neck supple. Skin:     General: Skin is warm. Neurological:      General: No focal deficit present. Mental Status: He is alert. Labs:   Recent Labs     09/14/21  0622 09/15/21  0554   WBC 3.4* 7.3   HGB 16.0 14.1   HCT 47.3 40.7*    277     Recent Labs     09/14/21  0622 09/15/21  0554    134*   K 5.2 4.9   CL 97* 98   CO2 24 23   BUN 18 28*   CREATININE 0.8 0.7   CALCIUM 9.7 9.3     Recent Labs     09/14/21  0622   AST 26   ALT 27   BILITOT 0.6   ALKPHOS 79     No results for input(s): INR in the last 72 hours. No results for input(s): Owen Hug in the last 72 hours. Urinalysis:    No results found for: Denny Arrow, BACTERIA, RBCUA, BLOODU, SPECGRAV, [address]    Radiology:  CTA CHEST W WO CONTRAST   Final Result   Extensive bilateral groundglass infiltrates no PE            **This report has been created using voice recognition software. It may contain minor errors which are inherent in voice recognition technology. **      Final report electronically signed by Dr. Rafaela Gutierrez on 9/13/2021 6:05 PM      XR CHEST PORTABLE   Final Result   Bilateral peripheral infiltrates both upper and lower lungs            **This report has been created using voice recognition software.   It may contain minor errors which are inherent in voice recognition technology. **      Final report electronically signed by Dr. Nandini Padron on 9/13/2021 3:47 PM          Diet: ADULT DIET; Regular; 4 carb choices (60 gm/meal)    DVT prophylaxis: [] Lovenox                                 [] SCDs                                 [] SQ Heparin                                 [] Encourage ambulation           [] Already on Anticoagulation     Disposition:    [x] Home       [] TCU       [] Rehab       [] Psych       [] SNF       [] [facility]       [] Other-    Code Status: Full Code    PT/OT Eval Status: On board    Assessment/Plan:    Anticipated Discharge in : [address] Problems    Diagnosis Date Noted    COVID-19 virus infection [U07.1] 09/13/2021     Priority: High    Acute respiratory failure with hypoxia ([facility] [address] [J96.01] 09/13/2021     Priority: High    Type 2 diabetes mellitus without complication, without long-term current use of insulin ([facility] [address] [E11.9] 09/13/2021     Priority: Low    Essential hypertension [I10] 04/11/2017     Priority: Low       1. Acute respiratory failure with hypoxia-patient presents with shortness of breath x2 weeks. Patient found to be hypoxic in the ED with an O2 saturation of 87% on room air. Patient placed on 2 L of oxygen with improvement of oxygen to 94%. Patient will be weaned off of oxygen as able. · 9/14/2021-patient ambulated and found to need 6 L of oxygen. Patient needed 2 L of oxygen at rest.  · 9/15/2021-patient needed 1 L of oxygen at rest.  Patient even weaned down to room air. Patient still requiring 6 L of oxygen while ambulating. · 9/16/2021-patient noted to be on room air. On ambulation, patient noted to be on 6 L of oxygen. 2. Pneumonia secondary COVID-19 infection-patient reports that he has had symptoms for the past 2 weeks. Patient tested positive for COVID-19 on 9/13/2021.   As patient has had symptoms for the past 2 weeks, patient will not be started on remdesivir. Patient will be placed on dexamethasone. We will also give patient vitamin supplements with zinc, vitamin C and vitamin D. We will follow patient's inflammatory markers during hospitalization. · 9/15/2021-inflammatory markers including CRP, LDH and ferritin trending down  3. Essential hypertension-patient with history of hypertension. Patient will be placed on his home blood pressure medications. 4. Type 2 diabetes-patient with history of type 2 diabetes. Patient's oral medications will be held for now. Patient will be placed on Lantus weight-based. Dexamethasone will most likely cause patient's blood sugars to go up. Patient will also be placed on insulin sliding scale.   · 9/14/2021-Lantus changed to 30 units nightly  · 9/16/2021-Lantus changed to 25 units twice daily        Electronically signed by Chan Biggs MD on 9/16/2021 at 10:31 PM

## 2021-09-17 NOTE — FLOWSHEET NOTE
21 1055   Encounter Summary   Services provided to: Patient   Referral/Consult From: Rounding   Continue Visiting Yes  ()   Complexity of Encounter Moderate   Length of Encounter 30 minutes   Spiritual Assessment Completed Yes   Advance Care Planning Yes   Routine   Type Initial   Assessment Calm; Approachable   Intervention Active listening;Nurtured hope   Outcome Comfort;Expressed gratitude;Engaged in conversation   Spiritual/Christianity   Type Spiritual support   Assessment: In my encounter with the 64 yr old COVID-19 patient, while rounding on unit 8A,  I provided spiritual care to patient through conversation, I also came to verify if the patient had a Advance Directive on file. The pt was sitting in the chair and watching TV. As we engaged in conversation he expressed his physical struggles with COVID. Interventions:  I provided prayer, emotional support and words of comfort. The pt did not have a Advance Directive on file at this time. When verifying the pt's chart it stated that the pt's Power of Citlaly Varma was . When asked the pt stated that he has a Advance Directive I requested that his wife or family bring in the documents. Outcomes: The patient was encouraged and didnt share any further spiritual needs at this time. The pt remains optimistic and hopeful. The pt shared that they were appreciative for the support. The pt shared that his wife Glenda Anguiano was his POA [phone]. Plan:  1. Chaplains will follow-up at a later time for assessment of any spiritual care needs present. 2.   At a later time the patient the pt might be interested in Advance Directive information.

## 2021-09-17 NOTE — CARE COORDINATION
9/17/21, 11:46 AM EDT    DISCHARGE ON GOING [address] [facility] day: 4  Location: 8A-10/010-A Reason for admit: Acute hypoxemic respiratory failure due to COVID-19 ([facility] [address] [U07.1, J96.01]  COVID-19 virus infection [U07.1]     Barriers to Discharge: Dexamethasone, Lovenox, DM management, prn medications, Home oxygen eval yesterday per respiratory therapist. (patient required 8L oxygen with ambulation), carb choice diet, acapella, incentive spirometry, droplet plus isolation, up as tolerated  PCP: WOO Rhodes CNP  Readmission Risk Score: 10%  Patient Goals/Plan/Treatment Preferences:Yeison is from home with his wife and would like to go home at discharge. He has denied need for [facility] services. I updated Jayant Barber [facility] DME that he will need home oxygen at discharge.

## 2021-09-17 NOTE — PROGRESS NOTES
A home oxygen evaluation has been completed. [x]Patient is an inpatient. It is expected that the patient will be discharged within the next 48 hours. Qualified provider to write order for home prescription if patient qualifies. Social service/care managers will arrange for home oxygen. If patient is active, arrange for Home Medical supplier to assess for Oxygen Conserving Device per pulse oximetry. []Patient is an outpatient. Results will be faxed to the ordering provider. Qualified provider to write order for home prescription if patient qualifies and arranges for home oxygen. Patient was placed on room air for 60 minutes. SpO2 was 91 % on room air at rest. Patients SpO2 was 89% or above and did not qualify for home oxygen. Patient was walked for 10 minutes. SpO2 was 80 % during walking. Patients SpO2 was below 89% and qualified for home oxygen. Oxygen was applied at 8 lpm via nasal cannula to maintain a SpO2 between 87-88% while walking. Actual SpO2 was 86-88 % on 8 L/M via NC. Returned patient to room. When patient at rest in chair; SPO2 will improve to 89-91% on room air. Patient's demand for O2 increased significantly with exertion of ambulating in the hallway approximately 130 ft. If oxygen need is greater than 4 lpm the SpO2 on 4 lpm was 80-82% on exertion (ambulating in the hallway). Note: For any SpO2 at 38% see policy and procedure for possible qualifications.

## 2021-09-18 VITALS
WEIGHT: 225 LBS | RESPIRATION RATE: 18 BRPM | BODY MASS INDEX: 31.5 KG/M2 | TEMPERATURE: 97.9 F | SYSTOLIC BLOOD PRESSURE: 128 MMHG | DIASTOLIC BLOOD PRESSURE: 82 MMHG | HEIGHT: 71 IN | HEART RATE: 74 BPM | OXYGEN SATURATION: 94 %

## 2021-09-18 LAB
GLUCOSE BLD-MCNC: 139 MG/DL (ref 70–108)
GLUCOSE BLD-MCNC: 194 MG/DL (ref 70–108)
GLUCOSE BLD-MCNC: 233 MG/DL (ref 70–108)

## 2021-09-18 PROCEDURE — 2580000003 HC RX 258: Performed by: HOSPITALIST

## 2021-09-18 PROCEDURE — 6370000000 HC RX 637 (ALT 250 FOR IP): Performed by: HOSPITALIST

## 2021-09-18 PROCEDURE — 82948 REAGENT STRIP/BLOOD GLUCOSE: CPT

## 2021-09-18 PROCEDURE — 6360000002 HC RX W HCPCS: Performed by: HOSPITALIST

## 2021-09-18 PROCEDURE — 94669 MECHANICAL CHEST WALL OSCILL: CPT

## 2021-09-18 PROCEDURE — 99239 HOSP IP/OBS DSCHRG MGMT >30: CPT | Performed by: HOSPITALIST

## 2021-09-18 RX ORDER — GUAIFENESIN/DEXTROMETHORPHAN 100-10MG/5
5 SYRUP ORAL EVERY 4 HOURS PRN
Qty: 120 ML | Refills: 0 | Status: SHIPPED | OUTPATIENT
Start: 2021-09-18 | End: 2021-09-28

## 2021-09-18 RX ORDER — DEXAMETHASONE 6 MG/1
6 TABLET ORAL DAILY
Qty: 6 TABLET | Refills: 0 | Status: SHIPPED | OUTPATIENT
Start: 2021-09-18 | End: 2021-09-24

## 2021-09-18 RX ADMIN — ENOXAPARIN SODIUM 30 MG: 30 INJECTION SUBCUTANEOUS at 07:38

## 2021-09-18 RX ADMIN — LISINOPRIL 5 MG: 5 TABLET ORAL at 07:39

## 2021-09-18 RX ADMIN — ASPIRIN 81 MG: 81 TABLET, COATED ORAL at 07:38

## 2021-09-18 RX ADMIN — INSULIN GLARGINE 25 UNITS: 100 INJECTION, SOLUTION SUBCUTANEOUS at 07:41

## 2021-09-18 RX ADMIN — SODIUM CHLORIDE, PRESERVATIVE FREE 10 ML: 5 INJECTION INTRAVENOUS at 07:40

## 2021-09-18 ASSESSMENT — PAIN SCALES - GENERAL
PAINLEVEL_OUTOF10: 0

## 2021-09-18 NOTE — PROGRESS NOTES
A home oxygen evaluation has been completed. [x]Patient is an inpatient. It is expected that the patient will be discharged within the next 48 hours. Qualified provider to write order for home prescription if patient qualifies. Social service/care managers will arrange for home oxygen. If patient is active, arrange for Home Medical supplier to assess for Oxygen Conserving Device per pulse oximetry. []Patient is an outpatient. Results will be faxed to the ordering provider. Qualified provider to write order for home prescription if patient qualifies and arranges for home oxygen. Patient was placed on room air for 20 minutes. SpO2 was 93 % on room air at rest. Patients SpO2 was 89% or above and did not qualify for home oxygen. Patient was walked. SpO2 was 86 % during walking. Patients SpO2 was below 89% and qualified for home oxygen. Oxygen was applied at 4 lpm via nasal cannula to maintain a SpO2 between 90-92% while walking. Actual SpO2 was 90 %. If oxygen need is greater than 4 lpm the SpO2 on 4 lpm was . Note: For any SpO2 at 83% see policy and procedure for possible qualifications.

## 2021-09-18 NOTE — PROGRESS NOTES
Daughter Lester called and was a bit frustrated that her dad is still here on room air. She states he called her mother and said his oxygen level dropped when at rest. She is wonder 1. Why hasn't he been getting breathing treatments? 2. If his oxygen is dropping at rest why isn't he on oxygen via nasal cannula? 3. If he requires to stay then why isn't he getting chest physiotherapy? She wants to know why he isn't going home the way he is or why we are not being more aggressive with his treatment. She missed a call from Dr Zhao Mahan yesterday and would like for her to call her again today.

## 2021-09-18 NOTE — PROGRESS NOTES
Call placed to patient's daughter - [facility]. Update given on new order and POC. Also informed Dr. Larry Anne daughter requesting to speak with her.

## 2021-09-18 NOTE — PROGRESS NOTES
Normal appearance. HENT:      Head: Normocephalic and atraumatic. Right Ear: External ear normal.      Left Ear: External ear normal.      Nose: Nose normal.      Mouth/Throat:      Pharynx: Oropharynx is clear. Eyes:      Extraocular Movements: Extraocular movements intact. Pupils: Pupils are equal, round, and reactive to light. Cardiovascular:      Rate and Rhythm: Normal rate and regular rhythm. Pulses: Normal pulses. Heart sounds: Normal heart sounds. No murmur heard. No friction rub. No gallop. Pulmonary:      Effort: Pulmonary effort is normal. No respiratory distress. Breath sounds: Normal breath sounds. No wheezing, rhonchi or rales. Abdominal:      General: Bowel sounds are normal. There is no distension. Tenderness: There is no abdominal tenderness. Musculoskeletal:         General: No swelling. Normal range of motion. Cervical back: Normal range of motion and neck supple. Skin:     General: Skin is warm. Neurological:      General: No focal deficit present. Mental Status: He is alert. Labs:   Recent Labs     09/15/21  0554   WBC 7.3   HGB 14.1   HCT 40.7*        Recent Labs     09/15/21  0554   *   K 4.9   CL 98   CO2 23   BUN 28*   CREATININE 0.7   CALCIUM 9.3     No results for input(s): AST, ALT, BILIDIR, BILITOT, ALKPHOS in the last 72 hours. No results for input(s): INR in the last 72 hours. No results for input(s): Donah Keens in the last 72 hours. Urinalysis:    No results found for: Braxton Neha, BACTERIA, RBCUA, BLOODU, SPECGRAV, [address]    Radiology:  XR CHEST PORTABLE   Final Result   1. Overall similar aeration when compared to the prior examination with patchy consolidative opacities again seen along the periphery of the mid and lower lung zones. Findings are consistent with pneumonia. **This report has been created using voice recognition software.   It may contain minor errors which are improvement of oxygen to 94%. Patient will be weaned off of oxygen as able. · 9/14/2021-patient ambulated and found to need 6 L of oxygen. Patient needed 2 L of oxygen at rest.  · 9/15/2021-patient needed 1 L of oxygen at rest.  Patient even weaned down to room air. Patient still requiring 6 L of oxygen while ambulating. · 9/16/2021-patient noted to be on room air. On ambulation, patient noted to be on 6 L of oxygen. · 9/17/2021-patient noted to be on room air at rest.  On ambulation, patient noted to be on 8 L of oxygen to maintain an oxygen saturation in the mid 80s. I spoke with respiratory therapist and they stated that patient can go home with as much as 10 L of oxygen. We will attempt to set patient up with oxygen in the morning. 2. Pneumonia secondary COVID-19 infection-patient reports that he has had symptoms for the past 2 weeks. Patient tested positive for COVID-19 on 9/13/2021. As patient has had symptoms for the past 2 weeks, patient will not be started on remdesivir. Patient will be placed on dexamethasone. We will also give patient vitamin supplements with zinc, vitamin C and vitamin D. We will follow patient's inflammatory markers during hospitalization. · 9/15/2021-inflammatory markers including CRP, LDH and ferritin trending down  · 9/17/2021-inflammatory markers trending down  3. Essential hypertension-patient with history of hypertension. Patient will be placed on his home blood pressure medications. 4. Type 2 diabetes-patient with history of type 2 diabetes. Patient's oral medications will be held for now. Patient will be placed on Lantus weight-based. Dexamethasone will most likely cause patient's blood sugars to go up. Patient will also be placed on insulin sliding scale.   · 9/14/2021-Lantus changed to 30 units nightly  · 9/16/2021-Lantus changed to 25 units twice daily        Electronically signed by Kayden Ch MD on 9/17/2021 at 8:12 PM

## 2021-09-18 NOTE — DISCHARGE SUMMARY
Discharge Summary    Patient:  José Manuel Gooden  YOB: 1965    MRN: [id number]   Acct: [de-identified]    Primary Care Physician: WOO Hendrix CNP    Admit date:  9/13/2021    Discharge date:   9/18/2021      Discharge Diagnoses:   COVID-19 virus infection  Principal Problem:    COVID-19 virus infection  Active Problems:    Acute respiratory failure with hypoxia ([address]    Essential hypertension    Type 2 diabetes mellitus without complication, without long-term current use of insulin ([facility])  Resolved Problems:    * No resolved hospital problems. *        Admitted for: (HPI)  64 y.o. male with medical history significant for hypertension, type 2 diabetes; who presented to [facility] with generalized weakness and shortness of breath x2 weeks. Patient reports that for the past 2 weeks he has been having decreased appetite and decreased level of activity. Patient has noted some fevers and chills in the earlier part of his illness. Patient also reports having shortness of breath with cough. Patient states that he has had some occasional chest pain. Patient notes having diarrhea for few days that has since resolved. Patient also reports having some dizziness. Patient states that he has had some nausea which he attributes to coughing up a lot of phlegm and swallowing it. Patient states that the shortness of breath has gotten progressive to the point where he has a difficult time being able to ambulate in his home. No URI or UTI type symptoms, no constipation. Hospital Course:  1. Acute respiratory failure with hypoxia-patient presents with shortness of breath x2 weeks. Patient found to be hypoxic in the ED with an O2 saturation of 87% on room air. Patient placed on 2 L of oxygen with improvement of oxygen to 94%. Patient will be weaned off of oxygen as able. · 9/14/2021-patient ambulated and found to need 6 L of oxygen.   Patient needed 2 L of oxygen at rest.  · 9/15/2021-patient needed 1 L of oxygen at rest.  Patient even weaned down to room air. Patient still requiring 6 L of oxygen while ambulating. · 9/16/2021-patient noted to be on room air. On ambulation, patient noted to be on 6 L of oxygen. · 9/17/2021-patient noted to be on room air at rest.  On ambulation, patient noted to be on 8 L of oxygen to maintain an oxygen saturation in the mid 80s. I spoke with respiratory therapist and they stated that patient can go home with as much as 10 L of oxygen. We will attempt to set patient up with oxygen in the morning. · 9/18/2021-patient on room air at rest.  Patient qualified for 4L oxygen on exertion   2. Pneumonia secondary COVID-19 infection-patient reports that he has had symptoms for the past 2 weeks. Patient tested positive for COVID-19 on 9/13/2021. As patient has had symptoms for the past 2 weeks, patient will not be started on remdesivir. Patient will be placed on dexamethasone. We will also give patient vitamin supplements with zinc, vitamin C and vitamin D. We will follow patient's inflammatory markers during hospitalization. · 9/15/2021-inflammatory markers including CRP, LDH and ferritin trending down  · 9/17/2021-inflammatory markers trending down  · 9/18/2021- CXR looks the same as on admission- Overall similar aeration when compared to the prior examination with patchy consolidative opacities again seen along the periphery of the mid and lower lung zones. 3. Essential hypertension-patient with history of hypertension. Patient will be placed on his home blood pressure medications. 4. Type 2 diabetes-patient with history of type 2 diabetes. Patient's oral medications will be held for now. Patient will be placed on Lantus weight-based. Dexamethasone will most likely cause patient's blood sugars to go up. Patient will also be placed on insulin sliding scale.   · 9/14/2021-Lantus changed to 30 units nightly  · 9/16/2021-Lantus changed to 25 units twice daily  · 9/18/2021-patient discharged on home regime for diabetes     Patient was evaluated today for the diagnosis of COVID pneumonia. I entered a DME order for home oxygen because the diagnosis and testing requires the patient to have supplemental oxygen. Condition will improve or be benefited by oxygen use. The patient is  able to perform good mobility in a home setting and therefore does require the use of a portable oxygen system. The need for this equipment was discussed with the patient and he understands and is in agreement.     Consultants:  None    Discharge Medications:       Medication List      START taking these medications    dexamethasone 6 MG tablet  Commonly known as: DECADRON  Take 1 tablet by mouth daily for 6 days     guaiFENesin-dextromethorphan 100-10 MG/5ML syrup  Commonly known as: ROBITUSSIN DM  Take 5 mLs by mouth every 4 hours as needed for Cough        CONTINUE taking these medications    aspirin EC 81 MG EC tablet  Take 1 tablet by mouth daily     lisinopril 5 MG tablet  Commonly known as: PRINIVIL;ZESTRIL  Take 1 tablet by mouth daily     metFORMIN 1000 MG tablet  Commonly known as: GLUCOPHAGE  Take 1 tablet by mouth 2 times daily (with meals)           Where to Get Your Medications      These medications were sent to [facility]    Phone: [phone]   · dexamethasone 6 MG tablet  · guaiFENesin-dextromethorphan 100-10 MG/5ML syrup           Physical Exam:    Vitals:  Vitals:    09/18/21 0011 09/18/21 0348 09/18/21 0737 09/18/21 1122   BP: (!) 148/87 137/80 139/81 129/78   Pulse: 64 56 83 69   Resp: 18 18 18 18   Temp: 97.8 °F (36.6 °C) 97.7 °F (36.5 °C) 97.8 °F (36.6 °C) 97.5 °F (36.4 °C)   TempSrc: Oral Oral Oral Oral   SpO2: 93% 97% 92% 93%   Weight:       Height:         Weight: Weight: 225 lb (102.1 kg)     24 hour intake/output:    Intake/Output Summary (Last 24 hours) at 9/18/2021 1201  Last data filed at 9/18/2021 0945  Gross per 24 hour   Intake 1490 ml   Output --   Net 1490 ml       General appearance - alert, well appearing, and in no distress  Chest - clear to auscultation, no wheezes, rales or rhonchi, symmetric air entry  Heart - normal rate, regular rhythm, normal S1, S2, no murmurs, rubs, clicks or gallops  Abdomen - soft, nontender, nondistended, no masses or organomegaly  Obese: No; Protuberant: No   Neurological - alert, oriented, normal speech, no focal findings or movement disorder noted  Extremities - peripheral pulses normal, no pedal edema, no clubbing or cyanosis  Skin - normal coloration and turgor, no rashes, no suspicious skin lesions noted    Procedures:  None    Diagnostic Test:  CBC, BMP, serial LDH, CRP and ferritin     Radiology reports as per the Radiologist  Radiology: CTA CHEST W WO CONTRAST    Result Date: 9/13/2021  PROCEDURE: CTA CHEST W WO CONTRAST CLINICAL INFORMATION: sob, covid, d-dimer . COMPARISON: No prior study. TECHNIQUE: 2-D multiplanar postcontrast images of the chest with 3-D MIPS. Isovue-370 IV contrast. All CT scans at this facility use dose modulation, iterative reconstruction, and/or weight-based dosing when appropriate to reduce radiation dose to as low as reasonably achievable. FINDINGS: There is no PE. There is no aneurysm or dissection. There is no mediastinal or hilar adenopathy is no axillary lymphadenopathy. Extensive groundglass infiltrates bilaterally. Greater in the lower lobes. No associated effusion. Upper abdomen Adrenals are normal. No suspicious bone lesion    Extensive bilateral groundglass infiltrates no PE **This report has been created using voice recognition software. It may contain minor errors which are inherent in voice recognition technology. ** Final report electronically signed by Dr. Prabhu Yoo on 9/13/2021 6:05 PM    XR CHEST PORTABLE    Result Date: 9/13/2021  PROCEDURE: XR CHEST PORTABLE CLINICAL INFORMATION: sob . COMPARISON: No prior study. TECHNIQUE: Portable upright FINDINGS: Heart size normal. Mediastinum is not widened. Bilateral peripheral airspace infiltrates both upper and lower lungs. No gross effusions. Vascularity is normal. EKG leads overlie the chest.     Bilateral peripheral infiltrates both upper and lower lungs **This report has been created using voice recognition software. It may contain minor errors which are inherent in voice recognition technology. ** Final report electronically signed by Dr. Donald Nguyen on 9/13/2021 3:47 PM       Results for orders placed or performed during the hospital encounter of 09/13/21   COVID-19, Rapid    Specimen: Nasopharyngeal Swab   Result Value Ref Range    SARS-CoV-2, NAAT DETECTED (AA) NOT DETECTED   Legionella antigen, urine    Specimen: Urine, clean catch   Result Value Ref Range    Legionella Pneumophilia Ag, Urine NEGATIVE    Culture, Respiratory, Sputum    Specimen: Sputum   Result Value Ref Range    Respiratory Culture Normal montana- preliminary Normal montana      Gram Stain Result       Few segmented neutrophils observed. Few epithelial cells observed. Many gram positive cocci occurring singly and in pairs. Many gram negative bacilli. Many gram positive bacilli.     Strep Pneumoniae Antigen    Specimen: Urine, clean catch   Result Value Ref Range    Strep pneumo Ag, Ur NEGATIVE    C-Reactive Protein   Result Value Ref Range    CRP 29.22 (H) 0.00 - 1.00 mg/dl   Lactate Dehydrogenase   Result Value Ref Range     (H) 100 - 190 U/L   Ferritin   Result Value Ref Range    Ferritin 2,443 (H) 22 - 322 ng/mL   D-Dimer, Quantitative   Result Value Ref Range    D-Dimer, Quant 1353.00 (H) 0.00 - 500.00 ng/ml FEU   Troponin   Result Value Ref Range    Troponin T < 0.010 ng/ml   Lactic Acid, Plasma   Result Value Ref Range    Lactic Acid 2.7 (H) 0.5 - 2.0 mmol/L   Vitamin D 25 Hydroxy   Result Value Ref Range    Vit D, 25-Hydroxy 22 (L) 30 - 100 ng/ml   Procalcitonin   Result Value Ref Range    Procalcitonin 0.37 (H) 0.01 - 0.09 ng/mL   Comprehensive Metabolic Panel w/ Reflex to MG   Result Value Ref Range    Glucose 260 (H) 70 - 108 mg/dL    CREATININE 0.8 0.4 - 1.2 mg/dL    BUN 14 7 - 22 mg/dL    Sodium 134 (L) 135 - 145 meq/L    Potassium reflex Magnesium 3.5 3.5 - 5.2 meq/L    Chloride 95 (L) 98 - 111 meq/L    CO2 20 (L) 23 - 33 meq/L    Calcium 9.2 8.5 - 10.5 mg/dL    AST 26 5 - 40 U/L    Alkaline Phosphatase 77 38 - 126 U/L    Total Protein 6.7 6.1 - 8.0 g/dL    Albumin 3.1 (L) 3.5 - 5.1 g/dL    Total Bilirubin 0.6 0.3 - 1.2 mg/dL    ALT 24 11 - 66 U/L   CBC Auto Differential   Result Value Ref Range    WBC 6.9 4.8 - 10.8 thou/mm3    RBC 4.99 4.70 - 6.10 mill/mm3    Hemoglobin 15.0 14.0 - 18.0 gm/dl    Hematocrit 43.1 42.0 - 52.0 %    MCV 86.4 80.0 - 94.0 fL    MCH 30.1 26.0 - 33.0 pg    MCHC 34.8 32.2 - 35.5 gm/dl    RDW-CV 12.0 11.5 - 14.5 %    RDW-SD 38.0 35.0 - 45.0 fL    Platelets 237 997 - 964 thou/mm3    MPV 11.1 9.4 - 12.4 fL    Seg Neutrophils 85.6 %    Lymphocytes 7.7 %    Monocytes 5.3 %    Eosinophils 0.1 %    Basophils 0.3 %    Immature Granulocytes 1.0 %    Segs Absolute 5.9 1 - 7 thou/mm3    Lymphocytes Absolute 0.5 (L) 1.0 - 4.8 thou/mm3    Monocytes Absolute 0.4 0.4 - 1.3 thou/mm3    Eosinophils Absolute 0.0 0.0 - 0.4 thou/mm3    Basophils Absolute 0.0 0.0 - 0.1 thou/mm3    Immature Grans (Abs) 0.07 0.00 - 0.07 thou/mm3    nRBC 0 /100 wbc   Protime-INR   Result Value Ref Range    INR 1.29 (H) 0.85 - 1.13   APTT   Result Value Ref Range    aPTT 29.0 22.0 - 38.0 seconds   Fibrinogen   Result Value Ref Range    Fibrinogen 970 (H) 155 - 475 mg/100ml   Anion Gap   Result Value Ref Range    Anion Gap 19.0 (H) 8.0 - 16.0 meq/L   Glomerular Filtration Rate, Estimated   Result Value Ref Range    Est, Glom Filt Rate >90 ml/min/1.73m2   Magnesium   Result Value Ref Range    Magnesium 2.0 1.6 - 2.4 mg/dL Osmolality   Result Value Ref Range    Osmolality Calc 277.7 275.0 - 300.0 mOsmol/kg   CBC Auto Differential   Result Value Ref Range    WBC 3.4 (L) 4.8 - 10.8 thou/mm3    RBC 5.35 4.70 - 6.10 mill/mm3    Hemoglobin 16.0 14.0 - 18.0 gm/dl    Hematocrit 47.3 42.0 - 52.0 %    MCV 88.4 80.0 - 94.0 fL    MCH 29.9 26.0 - 33.0 pg    MCHC 33.8 32.2 - 35.5 gm/dl    RDW-CV 12.1 11.5 - 14.5 %    RDW-SD 39.0 35.0 - 45.0 fL    Platelets 444 137 - 684 thou/mm3    MPV 10.8 9.4 - 12.4 fL    Seg Neutrophils 73.6 %    Lymphocytes 15.1 %    Monocytes 9.8 %    Eosinophils 0.0 %    Basophils 0.3 %    Immature Granulocytes 1.2 %    Segs Absolute 2.5 1 - 7 thou/mm3    Lymphocytes Absolute 0.5 (L) 1.0 - 4.8 thou/mm3    Monocytes Absolute 0.3 (L) 0.4 - 1.3 thou/mm3    Eosinophils Absolute 0.0 0.0 - 0.4 thou/mm3    Basophils Absolute 0.0 0.0 - 0.1 thou/mm3    Immature Grans (Abs) 0.04 0.00 - 0.07 thou/mm3    nRBC 0 /100 wbc   Comprehensive Metabolic Panel w/ Reflex to MG   Result Value Ref Range    Glucose 301 (H) 70 - 108 mg/dL    CREATININE 0.8 0.4 - 1.2 mg/dL    BUN 18 7 - 22 mg/dL    Sodium 136 135 - 145 meq/L    Potassium reflex Magnesium 5.2 3.5 - 5.2 meq/L    Chloride 97 (L) 98 - 111 meq/L    CO2 24 23 - 33 meq/L    Calcium 9.7 8.5 - 10.5 mg/dL    AST 26 5 - 40 U/L    Alkaline Phosphatase 79 38 - 126 U/L    Total Protein 7.1 6.1 - 8.0 g/dL    Albumin 3.0 (L) 3.5 - 5.1 g/dL    Total Bilirubin 0.6 0.3 - 1.2 mg/dL    ALT 27 11 - 66 U/L   Procalcitonin   Result Value Ref Range    Procalcitonin 0.57 (H) 0.01 - 0.09 ng/mL   C-Reactive Protein   Result Value Ref Range    CRP 30.96 (H) 0.00 - 1.00 mg/dl   Lactate Dehydrogenase   Result Value Ref Range     (H) 100 - 190 U/L   Ferritin   Result Value Ref Range    Ferritin 2,680 (H) 22 - 322 ng/mL   D-Dimer, Quantitative   Result Value Ref Range    D-Dimer, Quant 1331.00 (H) 0.00 - 500.00 ng/ml FEU   Hemoglobin A1c   Result Value Ref Range    Hemoglobin A1C 10.8 (H) 4.4 - 6.4 % AVERAGE GLUCOSE 264 (H) 70 - 126 mg/dL   Anion Gap   Result Value Ref Range    Anion Gap 15.0 8.0 - 16.0 meq/L   Glomerular Filtration Rate, Estimated   Result Value Ref Range    Est, Glom Filt Rate >90 ml/min/1.73m2   C-Reactive Protein   Result Value Ref Range    CRP 11.76 (H) 0.00 - 1.00 mg/dl   D-Dimer, Quantitative   Result Value Ref Range    D-Dimer, Quant 858.00 (H) 0.00 - 500.00 ng/ml FEU   CBC Auto Differential   Result Value Ref Range    WBC 7.3 4.8 - 10.8 thou/mm3    RBC 4.66 (L) 4.70 - 6.10 mill/mm3    Hemoglobin 14.1 14.0 - 18.0 gm/dl    Hematocrit 40.7 (L) 42.0 - 52.0 %    MCV 87.3 80.0 - 94.0 fL    MCH 30.3 26.0 - 33.0 pg    MCHC 34.6 32.2 - 35.5 gm/dl    RDW-CV 11.9 11.5 - 14.5 %    RDW-SD 38.5 35.0 - 45.0 fL    Platelets 083 342 - 744 thou/mm3    MPV 11.0 9.4 - 12.4 fL    Seg Neutrophils 86.2 %    Lymphocytes 7.0 %    Monocytes 6.0 %    Eosinophils 0.0 %    Basophils 0.0 %    Immature Granulocytes 0.8 %    Segs Absolute 6.3 1 - 7 thou/mm3    Lymphocytes Absolute 0.5 (L) 1.0 - 4.8 thou/mm3    Monocytes Absolute 0.4 0.4 - 1.3 thou/mm3    Eosinophils Absolute 0.0 0.0 - 0.4 thou/mm3    Basophils Absolute 0.0 0.0 - 0.1 thou/mm3    Immature Grans (Abs) 0.06 0.00 - 0.07 thou/mm3    nRBC 0 /100 wbc   Basic Metabolic Panel   Result Value Ref Range    Sodium 134 (L) 135 - 145 meq/L    Potassium 4.9 3.5 - 5.2 meq/L    Chloride 98 98 - 111 meq/L    CO2 23 23 - 33 meq/L    Glucose 335 (H) 70 - 108 mg/dL    BUN 28 (H) 7 - 22 mg/dL    CREATININE 0.7 0.4 - 1.2 mg/dL    Calcium 9.3 8.5 - 10.5 mg/dL   Lactate dehydrogenase   Result Value Ref Range     (H) 100 - 190 U/L   Ferritin   Result Value Ref Range    Ferritin 2,532 (H) 22 - 322 ng/mL   Anion Gap   Result Value Ref Range    Anion Gap 13.0 8.0 - 16.0 meq/L   Glomerular Filtration Rate, Estimated   Result Value Ref Range    Est, Glom Filt Rate >90 ml/min/1.73m2   Procalcitonin   Result Value Ref Range    Procalcitonin 0.33 (H) 0.01 - 0.09 ng/mL   C-Reactive Protein   Result Value Ref Range    CRP 4.25 (H) 0.00 - 1.00 mg/dl   D-Dimer, Quantitative   Result Value Ref Range    D-Dimer, Quant 819.00 (H) 0.00 - 500.00 ng/ml FEU   C-reactive protein   Result Value Ref Range    CRP 2.04 (H) 0.00 - 1.00 mg/dl   Lactate dehydrogenase   Result Value Ref Range     (H) 100 - 190 U/L   Ferritin   Result Value Ref Range    Ferritin 1,298 (H) 22 - 322 ng/mL   POCT glucose   Result Value Ref Range    POC Glucose 252 (H) 70 - 108 mg/dl   POCT glucose   Result Value Ref Range    POC Glucose 272 (H) 70 - 108 mg/dl   POCT glucose   Result Value Ref Range    POC Glucose 322 (H) 70 - 108 mg/dl   POCT glucose   Result Value Ref Range    POC Glucose 314 (H) 70 - 108 mg/dl   POCT glucose   Result Value Ref Range    POC Glucose 380 (H) 70 - 108 mg/dl   POCT glucose   Result Value Ref Range    POC Glucose 290 (H) 70 - 108 mg/dl   POCT glucose   Result Value Ref Range    POC Glucose 293 (H) 70 - 108 mg/dl   POCT glucose   Result Value Ref Range    POC Glucose 275 (H) 70 - 108 mg/dl   POCT glucose   Result Value Ref Range    POC Glucose 381 (H) 70 - 108 mg/dl   POCT glucose   Result Value Ref Range    POC Glucose 233 (H) 70 - 108 mg/dl   POCT glucose   Result Value Ref Range    POC Glucose 253 (H) 70 - 108 mg/dl   POCT glucose   Result Value Ref Range    POC Glucose 256 (H) 70 - 108 mg/dl   POCT glucose   Result Value Ref Range    POC Glucose 262 (H) 70 - 108 mg/dl   POCT glucose   Result Value Ref Range    POC Glucose 149 (H) 70 - 108 mg/dl   POCT glucose   Result Value Ref Range    POC Glucose 222 (H) 70 - 108 mg/dl   POCT glucose   Result Value Ref Range    POC Glucose 188 (H) 70 - 108 mg/dl   POCT glucose   Result Value Ref Range    POC Glucose 320 (H) 70 - 108 mg/dl   POCT glucose   Result Value Ref Range    POC Glucose 139 (H) 70 - 108 mg/dl       Diet:  ADULT DIET;  Regular; 4 carb choices (60 gm/meal)    Activity:  Up ad rosa (Patient can move independently)    Follow-up:  in 1-2 weeks with WOO Michel CNP    Disposition: home    Condition: Stable    Time Spent: 35 minutes    Electronically signed by Mariam Adams MD on 9/18/2021 at 12:01 PM    Discharging Hospitalist

## 2021-09-18 NOTE — PROGRESS NOTES
O2 delivered from DME company. Teaching and education done on Home O2 therapy. Patient voiced understanding. [place] [facility] [place]. Patient had all of belongings, discharge paperwork, and O2 tank with tubing.

## 2021-09-18 NOTE — PROGRESS NOTES
Notified [facility] for O2 to be delivered for patient discharge. Jhon informed this nurse he would let this nurse know when he could deliver the O2. Jhon aware patient is awaiting on the O2 to be able to be discharged home.

## 2021-09-20 ENCOUNTER — TELEPHONE (OUTPATIENT)
Dept: FAMILY MEDICINE CLINIC | Age: 56
End: 2021-09-20

## 2021-09-20 ENCOUNTER — CARE COORDINATION (OUTPATIENT)
Dept: CASE MANAGEMENT | Age: 56
End: 2021-09-20

## 2021-09-20 DIAGNOSIS — E11.65 TYPE 2 DIABETES MELLITUS WITH HYPERGLYCEMIA, WITHOUT LONG-TERM CURRENT USE OF INSULIN (HCC): ICD-10-CM

## 2021-09-20 DIAGNOSIS — I10 ESSENTIAL HYPERTENSION: ICD-10-CM

## 2021-09-20 NOTE — TELEPHONE ENCOUNTER
Patient's last appointment was : 9/24/2019  Patient's next appointment is : Visit date not found  Last refilled:9/24/2019

## 2021-09-20 NOTE — TELEPHONE ENCOUNTER
----- Message from Judith Persaud sent at 9/20/2021 10:49 AM EDT -----  Subject: Refill Request    QUESTIONS  Name of Medication? lisinopril (PRINIVIL;ZESTRIL) 5 MG tablet  Patient-reported dosage and instructions? Take 1 tablet by mouth daily  How many days do you have left? 0  Preferred Pharmacy? [facility] phone number (if available)? [phone]  ---------------------------------------------------------------------------  --------------,  Name of Medication? metFORMIN (GLUCOPHAGE) 1000 MG tablet  Patient-reported dosage and instructions? Take 1 tablet by mouth 2 times   daily (with meals)  How many days do you have left? 0  [facility])? [phone]  ---------------------------------------------------------------------------  --------------  CALL BACK INFO  What is the best way for the office to contact you? OK to leave message on   voicemail  Preferred Call Back Phone Number?  [phone]

## 2021-09-20 NOTE — LETTER
[address] [place] [address],[address] [facility] [address]  [facility] [address]  Phone: [phone]  Fax: [phone] [facility] WOO Nunez CNP        September 21, 2021    [place] [address]  [address] [address]      Dear Timur Bell:    We have made several attempts to contact you by phone and have   been unsuccessful. Please call our office at your earliest convenience  At [phone] opt [phone]. Thank you.       Sincerely,        Tatiana Schirmer, APRN - CNP

## 2021-09-20 NOTE — TELEPHONE ENCOUNTER
[facility] Transitions Initial Follow Up Call    Outreach made within 2 business days of discharge: Yes    Patient: Bernardo Clarke Patient : 1965   MRN: [id number]  Reason for Admission: There are no discharge diagnoses documented for the most recent discharge. Discharge Date: 21       Spoke with: patient; A message was left for the patient. Discharge department/facility: [facility]    TCM Interactive Patient Contact:      Scheduled appointment with PCP within 7-14 days    Follow Up  No future appointments.     Flooved

## 2021-09-20 NOTE — CARE COORDINATION
Covid-19 Initial Follow Up Call    Date/Time:  9/20/2021 9:34 AM  Attempted to reach patient by telephone. Call within 2 business days of discharge: Yes Left HIPPA compliant message requesting a return call. Will attempt to reach patient again.

## 2021-09-21 ENCOUNTER — CARE COORDINATION (OUTPATIENT)
Dept: CASE MANAGEMENT | Age: 56
End: 2021-09-21

## 2021-09-21 RX ORDER — LISINOPRIL 5 MG/1
5 TABLET ORAL DAILY
Qty: 90 TABLET | Refills: 2 | Status: SHIPPED | OUTPATIENT
Start: 2021-09-21

## 2021-09-21 NOTE — TELEPHONE ENCOUNTER
[facility] Transitions Initial Follow Up Call    Outreach made within 2 business days of discharge: Yes    Patient: Margy Waldron Patient : 1965   MRN: [id number]  Reason for Admission: There are no discharge diagnoses documented for the most recent discharge. Discharge Date: 21       Spoke with: KARISSA for patient to return call at their earliest convenience.        Discharge department/facility: [facility]    Contact letter sent     Scheduled appointment with PCP within 7-14 days    Follow Up  Future Appointments   Date Time Provider Dorcas De Luna   10/6/2021 10:30 AM [facility]

## 2021-09-21 NOTE — CARE COORDINATION
Covid-19 Initial Follow Up Call    Patient contacted regarding COVID-19 risk, exposure and diagnosis. Discussed COVID-19 related testing which was available at this time. Test results were positive. Patient informed of results, if available? Yes. Care Transition Nurse contacted the patient by telephone to perform post discharge assessment. Call within 2 business days of discharge: Yes. Verified name and  with patient as identifiers. Provided introduction to self, and explanation of the CTN/ACM role, and reason for call due to risk factors for infection and/or exposure to COVID-19. Spoke with Myra Hernandez, said he is doing good. Denies any Covid symptoms. Has not used the O2 since being home. His sats have been in th 90's. Takes it with him when he leaves the house, just in case he needs it. His appetite and fluid intake is adequate. Has appt with new provider on 10/6. Will send to SS if can get in earlier, he is in agreement. No other questions or concerns at this time. Will continue to follow. Symptoms reviewed with patient who verbalized the following symptoms: no new symptoms and no worsening symptoms. Due to no new or worsening symptoms encounter was not routed to provider for escalation. Discussed follow-up appointments. If no appointment was previously scheduled, appointment scheduling offered: Yes. [facility] follow up appointment(s):   Future Appointments   Date Time Provider Dorcas De Luna   10/6/2021 10:30 AM Shanda Osman MD [facility] follow up appointment(s): helen    Non-face-to-face services provided:  Scheduled appointment with PCP-10/6, checking for earlier appt  Obtained and reviewed discharge summary and/or continuity of care documents     Advance Care Planning:   Does patient have an Advance Directive:  reviewed and current. Educated patient about risk for severe COVID-19 due to risk factors according to CDC guidelines.  CTN reviewed discharge

## 2021-09-21 NOTE — CARE COORDINATION
Request from 73 Cait Laughlin RN., patient has a NP appt on 10/6/21 please call to see if that appt could be moved up. Called office, stated nothing available before that time. Called left msg for patient to return this writers call.     Nerissa Nathan, [address] [facility] Coordination Transition

## 2021-09-23 ENCOUNTER — TELEPHONE (OUTPATIENT)
Dept: FAMILY MEDICINE CLINIC | Age: 56
End: 2021-09-23

## 2021-09-23 NOTE — TELEPHONE ENCOUNTER
----- Message from [facility] sent at 9/23/2021  1:24 PM EDT -----  Subject: Message to Provider    QUESTIONS  Information for Provider?  called to advise the pt was   recently hospitalized for Covid on Sept. 13 and discharged on Sept. 18.   Shanda Swanson [phone]  ---------------------------------------------------------------------------  --------------  [address]  What is the best way for the office to contact you? OK to leave message on   voicemail  Preferred Call Back Phone Number? [phone]  ---------------------------------------------------------------------------  --------------  SCRIPT ANSWERS  Relationship to Patient? Third Party  Representative Name?  Destinee- Case management coordinator

## 2021-09-28 ENCOUNTER — CARE COORDINATION (OUTPATIENT)
Dept: CASE MANAGEMENT | Age: 56
End: 2021-09-28

## 2021-09-28 NOTE — CARE COORDINATION
[facility] Transitions Follow Up Call    2021    Patient: Jeaan Mckeon  Patient : 1965   MRN: [id number]  Reason for Admission: Acute hypoxemic resp distress d/t COVID  Discharge Date: 21 RARS: Readmission Risk Score: 9         Attempted to contact patient for COVID transitions f/u call. Contact information left to  requesting call back at the earliest convenience.     Follow Up  Future Appointments   Date Time Provider Dorcas De Luna   10/6/2021 10:30 AM [facility]

## 2021-10-05 ENCOUNTER — CARE COORDINATION (OUTPATIENT)
Dept: CASE MANAGEMENT | Age: 56
End: 2021-10-05

## 2021-10-05 NOTE — CARE COORDINATION
You Patient resolved from the Care Transitions episode on 10/5/2021  Discussed COVID-19 related testing which was available at this time. Test results were positive. Patient informed of results, if available? Yes    Patient/family has been provided the following resources and education related to COVID-19:                         Signs, symptoms and red flags related to COVID-19            CDC exposure and quarantine guidelines            Conduit exposure contact - [phone]            Contact for their local Department of Health                 Patient currently reports that the following symptoms have improved:  fever, fatigue, pain or aching joints, cough, shortness of breath, confusion or unusual change in mental status, chills or shaking, sweating, fast heart rate, fast breathing, dizziness/lightheadedness, less urine output, cold, clammy, and pale skin, low body temperature and no new/worsening symptoms     Spoke with Bill Poe, said he is doing great! Denies any Covid symptoms. Has not used his O2 and wants to return it. Instructed him to ask at his PCP appt tomorrow. May need an order to discontinue. Voiced understanding. Thanked me for the calls. No further outreach scheduled with this CTN/ACM. Episode of Care resolved. Patient has this CTN/ACM contact information if future needs arise.

## 2021-10-06 ENCOUNTER — OFFICE VISIT (OUTPATIENT)
Dept: FAMILY MEDICINE CLINIC | Age: 56
End: 2021-10-06
Payer: COMMERCIAL

## 2021-10-06 VITALS
RESPIRATION RATE: 16 BRPM | WEIGHT: 181.4 LBS | BODY MASS INDEX: 25.4 KG/M2 | HEIGHT: 71 IN | SYSTOLIC BLOOD PRESSURE: 130 MMHG | TEMPERATURE: 97.7 F | DIASTOLIC BLOOD PRESSURE: 82 MMHG | HEART RATE: 101 BPM | OXYGEN SATURATION: 98 %

## 2021-10-06 DIAGNOSIS — I10 ESSENTIAL HYPERTENSION: ICD-10-CM

## 2021-10-06 DIAGNOSIS — E11.9 TYPE 2 DIABETES MELLITUS WITHOUT COMPLICATION, WITHOUT LONG-TERM CURRENT USE OF INSULIN (HCC): Primary | ICD-10-CM

## 2021-10-06 DIAGNOSIS — Z12.11 SPECIAL SCREENING FOR MALIGNANT NEOPLASMS, COLON: ICD-10-CM

## 2021-10-06 DIAGNOSIS — B35.1 ONYCHOMYCOSIS: ICD-10-CM

## 2021-10-06 DIAGNOSIS — Z11.4 SCREENING FOR HIV (HUMAN IMMUNODEFICIENCY VIRUS): ICD-10-CM

## 2021-10-06 PROCEDURE — 99204 OFFICE O/P NEW MOD 45 MIN: CPT | Performed by: FAMILY MEDICINE

## 2021-10-06 RX ORDER — TERBINAFINE HYDROCHLORIDE 250 MG/1
250 TABLET ORAL DAILY
Qty: 90 TABLET | Refills: 1 | Status: SHIPPED | OUTPATIENT
Start: 2021-10-06 | End: 2022-01-04

## 2021-10-06 SDOH — ECONOMIC STABILITY: FOOD INSECURITY: WITHIN THE PAST 12 MONTHS, THE FOOD YOU BOUGHT JUST DIDN'T LAST AND YOU DIDN'T HAVE MONEY TO GET MORE.: NEVER TRUE

## 2021-10-06 SDOH — ECONOMIC STABILITY: FOOD INSECURITY: WITHIN THE PAST 12 MONTHS, YOU WORRIED THAT YOUR FOOD WOULD RUN OUT BEFORE YOU GOT MONEY TO BUY MORE.: NEVER TRUE

## 2021-10-06 ASSESSMENT — PATIENT HEALTH QUESTIONNAIRE - PHQ9
SUM OF ALL RESPONSES TO PHQ QUESTIONS 1-9: 0
SUM OF ALL RESPONSES TO PHQ9 QUESTIONS 1 & 2: 0
SUM OF ALL RESPONSES TO PHQ QUESTIONS 1-9: 0
2. FEELING DOWN, DEPRESSED OR HOPELESS: 0
1. LITTLE INTEREST OR PLEASURE IN DOING THINGS: 0
SUM OF ALL RESPONSES TO PHQ QUESTIONS 1-9: 0

## 2021-10-06 ASSESSMENT — ENCOUNTER SYMPTOMS
RHINORRHEA: 0
NAUSEA: 0
SORE THROAT: 0
ABDOMINAL PAIN: 0
WHEEZING: 0
CONSTIPATION: 0
COUGH: 0
SHORTNESS OF BREATH: 0
DIARRHEA: 0

## 2021-10-06 ASSESSMENT — SOCIAL DETERMINANTS OF HEALTH (SDOH): HOW HARD IS IT FOR YOU TO PAY FOR THE VERY BASICS LIKE FOOD, HOUSING, MEDICAL CARE, AND HEATING?: NOT HARD AT ALL

## 2021-10-06 NOTE — PROGRESS NOTES
[address] [place] [address] [address] [place] [address]  Dept: [phone]  Loc: [address] Jaskaran Smith (:  1965) is a 64 y.o. male, here for evaluation of the following chief complaint(s):  New Patient      ASSESSMENT/PLAN:  1. Type 2 diabetes mellitus without complication, without long-term current use of insulin (HCC)  -     CBC Auto Differential; Future  -     Comprehensive Metabolic Panel; Future  -     Hemoglobin A1C; Future  -     Lipid Panel; Future  -      DIABETES FOOT EXAM  -      DIABETES EYE EXAM; Future  -     Protein / creatinine ratio, urine; Future  2. Screening for HIV (human immunodeficiency virus)  -     HIV-1 and HIV-2 Antibodies; Future  3. Onychomycosis  -     terbinafine (LAMISIL) 250 MG tablet; Take 1 tablet by mouth daily, Disp-90 tablet, R-1Normal  4. Special screening for malignant neoplasms, colon  -     Cologuard (For External Results Only); Future    She just recently restarted on Metformin. We will continue with this and recheck labs in 3 months. Follow-up afterwards. Lamisil for onychomycosis. Cologuard for screening. Blood pressure controlled with lisinopril. Continue current dose. Return in about 3 months (around 2022) for follow up, DM, lab results. SUBJECTIVE/OBJECTIVE:  Patient presents to reestablish care. He was recently in the hospital for Covid associated respiratory distress. He states he has felt much better since returning home. He has not required any oxygen and does request that home health except the oxygen that he has. He denies any chest pain or shortness of breath. His sugars were also extremely elevated but he states that he was on steroids and had not been taking his Metformin for nearly a month. He states his sugars now are improving somewhat but still running about 150-200. Denies any signs or symptoms of hypoglycemia.   Also has hypertension but this has been well controlled. He is due for fasting labs and agrees to have this done. Review of Systems   Constitutional: Negative for chills, fatigue and fever. HENT: Negative for congestion, rhinorrhea and sore throat. Respiratory: Negative for cough, shortness of breath and wheezing. Cardiovascular: Negative for chest pain and palpitations. Gastrointestinal: Negative for abdominal pain, constipation, diarrhea and nausea. Genitourinary: Negative for dysuria and hematuria. Musculoskeletal: Negative for arthralgias and myalgias. Neurological: Negative for dizziness and headaches. Psychiatric/Behavioral: Negative for sleep disturbance. The patient is not nervous/anxious. Physical Exam  Vitals and nursing note reviewed. Constitutional:       Appearance: He is well-developed. HENT:      Head: Normocephalic and atraumatic. Eyes:      General: No scleral icterus. Right eye: No discharge. Left eye: No discharge. Conjunctiva/sclera: Conjunctivae normal.   Cardiovascular:      Rate and Rhythm: Normal rate and regular rhythm. Heart sounds: Normal heart sounds. Pulmonary:      Effort: Pulmonary effort is normal.      Breath sounds: Normal breath sounds. No wheezing. Feet:      Right foot:      Protective Sensation: 10 sites tested. 10 sites sensed. Toenail Condition: Right toenails are abnormally thick and long. Fungal disease present. Left foot:      Protective Sensation: 10 sites tested. 10 sites sensed. Toenail Condition: Left toenails are abnormally thick and long. Fungal disease present. Skin:     General: Skin is warm and dry. Neurological:      Mental Status: He is alert and oriented to person, place, and time. Mental status is at baseline. Psychiatric:         Behavior: Behavior normal.         Thought Content:  Thought content normal.         Judgment: Judgment normal.         Vitals:    10/06/21 1002   BP: 130/82   Pulse: 101   Resp: 16 Temp: 97.7 °F (36.5 °C)   SpO2: 98%              An electronic signature was used to authenticate this note.     --Codie Flor MD

## 2022-02-23 ENCOUNTER — TELEPHONE (OUTPATIENT)
Dept: FAMILY MEDICINE CLINIC | Age: 57
End: 2022-02-23

## 2022-07-12 ENCOUNTER — TELEPHONE (OUTPATIENT)
Dept: FAMILY MEDICINE CLINIC | Age: 57
End: 2022-07-12